# Patient Record
Sex: FEMALE | Race: WHITE | NOT HISPANIC OR LATINO | Employment: OTHER | URBAN - METROPOLITAN AREA
[De-identification: names, ages, dates, MRNs, and addresses within clinical notes are randomized per-mention and may not be internally consistent; named-entity substitution may affect disease eponyms.]

---

## 2018-05-21 ENCOUNTER — OFFICE VISIT (OUTPATIENT)
Dept: OPHTHALMOLOGY | Facility: CLINIC | Age: 83
End: 2018-05-21
Payer: MEDICARE

## 2018-05-21 DIAGNOSIS — H43.813 VITREOUS DETACHMENT OF BOTH EYES: ICD-10-CM

## 2018-05-21 DIAGNOSIS — Z96.1 PSEUDOPHAKIA: ICD-10-CM

## 2018-05-21 DIAGNOSIS — H04.123 DRY EYE SYNDROME OF BOTH EYES: ICD-10-CM

## 2018-05-21 DIAGNOSIS — H26.491 RIGHT POSTERIOR CAPSULAR OPACIFICATION: Primary | ICD-10-CM

## 2018-05-21 PROCEDURE — 66821 AFTER CATARACT LASER SURGERY: CPT | Mod: RT,S$GLB,, | Performed by: OPHTHALMOLOGY

## 2018-05-21 PROCEDURE — 92014 COMPRE OPH EXAM EST PT 1/>: CPT | Mod: 57,S$GLB,, | Performed by: OPHTHALMOLOGY

## 2018-05-21 PROCEDURE — 99999 PR PBB SHADOW E&M-EST. PATIENT-LVL II: CPT | Mod: PBBFAC,,, | Performed by: OPHTHALMOLOGY

## 2018-05-21 RX ORDER — METOPROLOL TARTRATE 50 MG/1
50 TABLET ORAL 2 TIMES DAILY
Refills: 5 | COMMUNITY
Start: 2018-03-23

## 2018-05-21 RX ORDER — WARFARIN 3 MG/1
TABLET ORAL
Refills: 2 | Status: ON HOLD | COMMUNITY
Start: 2018-03-26 | End: 2024-03-11 | Stop reason: CLARIF

## 2018-05-21 RX ORDER — FLUTICASONE FUROATE AND VILANTEROL TRIFENATATE 100; 25 UG/1; UG/1
POWDER RESPIRATORY (INHALATION)
Status: ON HOLD | COMMUNITY
Start: 2018-05-15 | End: 2024-03-16 | Stop reason: HOSPADM

## 2018-05-21 RX ORDER — ACETAMINOPHEN AND CODEINE PHOSPHATE 300; 30 MG/1; MG/1
TABLET ORAL
Refills: 1 | COMMUNITY
Start: 2018-05-10

## 2018-05-21 RX ORDER — ALPRAZOLAM 0.25 MG/1
0.25 TABLET ORAL 2 TIMES DAILY
Refills: 1 | COMMUNITY
Start: 2018-05-10

## 2018-05-21 NOTE — PROGRESS NOTES
"Subjective:       Patient ID: Garima Fraser is a 82 y.o. female.    Chief Complaint: Blurred Vision    HPI     DSL- 3/2/16     Pt states she has a "Film" over OD. Pt has flashes of light OD X 2 years.   Pt has eye allergies. Pt denies glare.    Eyemeds  At's OU PRN       Last edited by Grace Dunbar on 5/21/2018 12:52 PM. (History)             Assessment:       1. Right posterior capsular opacification    2. Vitreous detachment of both eyes    3. Dry eye syndrome of both eyes    4. Pseudophakia        Plan:       Visually significant  PCO OD- Pt. Wants Laser.     PVD's OU-Stable.  SUMMER-Doing well.        YAG CAP OD today. TE=   47.0 mj, Avg. 0.5mj, 94 pulses.  PF taper OD.  AT's.  RTC 2 wks.  "

## 2018-07-16 ENCOUNTER — OFFICE VISIT (OUTPATIENT)
Dept: OPHTHALMOLOGY | Facility: CLINIC | Age: 83
End: 2018-07-16
Payer: MEDICARE

## 2018-07-16 DIAGNOSIS — Z96.1 PSEUDOPHAKIA: ICD-10-CM

## 2018-07-16 DIAGNOSIS — H52.7 REFRACTIVE ERROR: ICD-10-CM

## 2018-07-16 DIAGNOSIS — Z98.890 POST-OPERATIVE STATE: Primary | ICD-10-CM

## 2018-07-16 PROCEDURE — 99999 PR PBB SHADOW E&M-EST. PATIENT-LVL II: CPT | Mod: PBBFAC,,, | Performed by: OPHTHALMOLOGY

## 2018-07-16 PROCEDURE — 99024 POSTOP FOLLOW-UP VISIT: CPT | Mod: S$GLB,,, | Performed by: OPHTHALMOLOGY

## 2018-07-16 NOTE — PROGRESS NOTES
Subjective:       Patient ID: Garima Fraser is a 83 y.o. female.    Chief Complaint: Post-op Evaluation (2 months po os)    HPI     Post-op Evaluation    Additional comments: 2 months po os           Comments   DSL- 5/21/18     Pt is here for 2 months Yag po os. Pt states Va OD had improved after Yag.   Pt has dry eyes.     Eyemeds  No gtts       Last edited by Grace Dunbar on 7/16/2018  1:25 PM. (History)             Assessment:       1. Post-operative state    2. Refractive error    3. Pseudophakia        Plan:       S/p YAG CAP OD-Doing well.  RE-Pt wants MRx for distance OU.        Give MRx for distance.  RTC 1 yr.

## 2019-03-28 ENCOUNTER — HOSPITAL ENCOUNTER (OUTPATIENT)
Dept: RADIOLOGY | Facility: HOSPITAL | Age: 84
Discharge: HOME | End: 2019-03-28
Attending: INTERNAL MEDICINE
Payer: MEDICARE

## 2019-03-28 ENCOUNTER — OFFICE VISIT (OUTPATIENT)
Dept: PULMONOLOGY | Facility: CLINIC | Age: 84
End: 2019-03-28
Payer: MEDICARE

## 2019-03-28 VITALS
DIASTOLIC BLOOD PRESSURE: 74 MMHG | BODY MASS INDEX: 37.79 KG/M2 | WEIGHT: 205.38 LBS | HEART RATE: 80 BPM | HEIGHT: 62 IN | SYSTOLIC BLOOD PRESSURE: 126 MMHG | OXYGEN SATURATION: 95 %

## 2019-03-28 DIAGNOSIS — R06.02 SOB (SHORTNESS OF BREATH): ICD-10-CM

## 2019-03-28 DIAGNOSIS — G47.33 OSA (OBSTRUCTIVE SLEEP APNEA): ICD-10-CM

## 2019-03-28 PROCEDURE — 99999 PR PBB SHADOW E&M-EST. PATIENT-LVL V: ICD-10-PCS | Mod: PBBFAC,,, | Performed by: INTERNAL MEDICINE

## 2019-03-28 PROCEDURE — 71046 XR CHEST PA AND LATERAL: ICD-10-PCS | Mod: 26,,, | Performed by: RADIOLOGY

## 2019-03-28 PROCEDURE — 1101F PT FALLS ASSESS-DOCD LE1/YR: CPT | Mod: CPTII,S$GLB,, | Performed by: INTERNAL MEDICINE

## 2019-03-28 PROCEDURE — 99499 RISK ADDL DX/OHS AUDIT: ICD-10-PCS | Mod: S$GLB,,, | Performed by: INTERNAL MEDICINE

## 2019-03-28 PROCEDURE — 99204 PR OFFICE/OUTPT VISIT, NEW, LEVL IV, 45-59 MIN: ICD-10-PCS | Mod: S$GLB,,, | Performed by: INTERNAL MEDICINE

## 2019-03-28 PROCEDURE — 71046 X-RAY EXAM CHEST 2 VIEWS: CPT | Mod: TC,FY

## 2019-03-28 PROCEDURE — 99999 PR PBB SHADOW E&M-EST. PATIENT-LVL V: CPT | Mod: PBBFAC,,, | Performed by: INTERNAL MEDICINE

## 2019-03-28 PROCEDURE — 99204 OFFICE O/P NEW MOD 45 MIN: CPT | Mod: S$GLB,,, | Performed by: INTERNAL MEDICINE

## 2019-03-28 PROCEDURE — 71046 X-RAY EXAM CHEST 2 VIEWS: CPT | Mod: 26,,, | Performed by: RADIOLOGY

## 2019-03-28 PROCEDURE — 1101F PR PT FALLS ASSESS DOC 0-1 FALLS W/OUT INJ PAST YR: ICD-10-PCS | Mod: CPTII,S$GLB,, | Performed by: INTERNAL MEDICINE

## 2019-03-28 PROCEDURE — 99499 UNLISTED E&M SERVICE: CPT | Mod: S$GLB,,, | Performed by: INTERNAL MEDICINE

## 2019-03-28 NOTE — PATIENT INSTRUCTIONS
Odette or Lea will contact you to schedule your sleep study. Their number is [phone]. The [facility] is located on 2nd floor of [facility].    We will call you when the sleep study results are ready - if you have not heard from us by 2 weeks from the date of the study, please call [phone].    You are advised to abstain from driving should you feel sleepy or drowsy.

## 2019-03-28 NOTE — PROGRESS NOTES
Garima Fraser  was seen as a new patient for the evaluation of sob.    CHIEF COMPLAINT:  Asthma and Shortness of Breath      HISTORY OF PRESENT ILLNESS: Garima Fraser is a 83 y.o. female  has a past medical history of After-cataract, obscuring vision (1/14/2015), Atrial fibrillation, Hearing loss, Osteoporosis, Shingles, and Spinal stenosis.  Patient with persistent zimmerman over 10 years.  Zimmerman worsen over past 6 months.  +zimmerman 1/2 block.  No chest pain.  No fever/chills.  No cough.  Occasional wheezing a/w heavy exertion.  No orthopnea.  No pnd.  +chronic lower extremities swelling.  +weight gain 30 lbs since 2017.  Dyspnea worsen when bending down to tie shoe lace.  Patient was started on breo without much improvement.  gerd control with prilosec.  Patient was prescribed breo without much improvement.      PAST MEDICAL HISTORY:    Active Ambulatory Problems     Diagnosis Date Noted    After-cataract, obscuring vision 01/14/2015    After-cataract, unspecified 01/14/2015    Vitreous detachment 01/14/2015    Refractive error 01/14/2015    Pseudophakia 01/14/2015    Dry eye syndrome 01/14/2015    Post-operative state 01/28/2015    Shingles 03/02/2016    Right posterior capsular opacification 03/02/2016    Atrial fibrillation with RVR 06/21/2016    SOB (shortness of breath) 06/24/2016    ADIS (obstructive sleep apnea) 03/28/2019     Resolved Ambulatory Problems     Diagnosis Date Noted    No Resolved Ambulatory Problems     Past Medical History:   Diagnosis Date    After-cataract, obscuring vision 1/14/2015    Atrial fibrillation     Hearing loss     Osteoporosis     Shingles     Spinal stenosis                 PAST SURGICAL HISTORY:    Past Surgical History:   Procedure Laterality Date    ABDOMINAL SURGERY      CATARACT EXTRACTION Right 06/29/10    CATARACT EXTRACTION Left 06/15/10    CHOLECYSTECTOMY      HYSTERECTOMY           FAMILY HISTORY:                Family History   Problem Relation  Age of Onset    Macular degeneration Mother     Cataracts Mother     Diabetes Sister     Glaucoma Sister     Cataracts Sister     Diabetes Paternal Aunt     Amblyopia Neg Hx     Blindness Neg Hx     Retinal detachment Neg Hx     Strabismus Neg Hx        SOCIAL HISTORY:          Tobacco:   Social History     Tobacco Use   Smoking Status Never Smoker     alcohol use:    Social History     Substance and Sexual Activity   Alcohol Use Yes    Comment: occasional               Occupation:  Former teacher and     ALLERGIES:    Review of patient's allergies indicates:  No Known Allergies    CURRENT MEDICATIONS:    Current Outpatient Medications   Medication Sig Dispense Refill    acetaminophen-codeine 300-30mg (TYLENOL #3) 300-30 mg Tab TK 1 T PO  TID  1    ALPRAZolam (XANAX) 0.25 MG tablet TK 1 T PO  BID  1    amitriptyline (ELAVIL) 50 MG tablet Take 1 tablet (50 mg total) by mouth every evening. 90 tablet 1    BREO ELLIPTA 100-25 mcg/dose diskus inhaler       citalopram (CELEXA) 20 MG tablet Take 20 mg by mouth 2 (two) times daily.       metoprolol tartrate (LOPRESSOR) 50 MG tablet TK 1 T PO BID  5    omeprazole (PRILOSEC) 20 MG capsule Take 20 mg by mouth 2 (two) times daily.      tolterodine (DETROL LA) 4 MG 24 hr capsule Take 4 mg by mouth once daily.      warfarin (COUMADIN) 3 MG tablet TK 1 T PO QD TO THIN THE BLOOD  2    albuterol 90 mcg/actuation inhaler Inhale 2 puffs into the lungs every 6 (six) hours as needed for Wheezing. 2 Inhaler 2    alendronate (FOSAMAX) 10 MG Tab Take 10 mg by mouth once daily.      apixaban 5 mg Tab Take 1 tablet (5 mg total) by mouth 2 (two) times daily. 60 tablet 0    apixaban 5 mg Tab Take 1 tablet (5 mg total) by mouth 2 (two) times daily. 180 tablet 3    aspirin 81 MG Chew Take 81 mg by mouth once daily.      furosemide (LASIX) 40 MG tablet One po daily x 5 days then 1/2 daily 30 tablet 0    hydrocodone-acetaminophen 5-325mg (NORCO) 5-325 mg per  "tablet Take 1 tablet by mouth every 6 (six) hours as needed for Pain. 25 tablet 0    lidocaine-prilocaine (EMLA) cream Apply topically 2 (two) times daily as needed. 25 g 1    mometasone-formoterol (DULERA) 100-5 mcg/actuation HFAA Inhale into the lungs 2 (two) times daily.       No current facility-administered medications for this visit.                   REVIEW OF SYSTEMS:     Pulmonary related symptoms as per HPI.  Gen:  no weight loss, no fever, no night sweat  HEENT:  no visual changes, no sore throat, no hearing loss  CV:  Per hpi  GI:  no melena, no hematochezia, no diarhea, no constipation.  :  no dysuria, no hematuria, no hesistancy, no dribbling  Neuro:  no syncope, no vertigo, no tinitus  Psych:  No homocide or suicide ideation.  Anxious and depressed  Endocrine:  No heat or cold intolerance.  Sleep:  + snoring; no witnessed apnea.  Feeling tire upon awake.  Otherwise, a balance of systems reviewed is negative.          PHYSICAL EXAM:  Vitals:    03/28/19 1355   BP: 126/74   Pulse: 80   SpO2: 95%   Weight: 93.2 kg (205 lb 6.4 oz)   Height: 5' 2" (1.575 m)   PainSc: 0-No pain     Body mass index is 37.57 kg/m².     GENERAL:  well develop; no apparent distress  HEENT:  no nasal congestion; no discharge noted; class 2 modified mallampatti.  Denture on top   NECK:  supple; no palpable masses.  CARDIO: regular rate and rhythm  PULM:  clear to auscultation bilaterally; no intercostals retractions; no accessory muscle usage   ABDOMEN:  soft nontender/nondistended.  +bowel sound  EXTREMITIES no cc; +trace edema  NEURO:  CN II-XII intact.  5/5 motor in all extremities.  sensation grossly intact   to light touch.  PSYCH:  normal affect.  Alert and oriented x 4    LABS  Pulmonary Functions Testing Results(personally reviewed):  none  ABG (personally reviewed):  none  CXR (personally reviewed):  12/1/16 no effusion or consolidation  CT CHEST(personally reviewed):  6/21/16 no effusion or consolidation; dependant " atelectasis.  No septal thickening.    Pro bnp 4960 (5-1800)    Echo 6/22/16  CONCLUSIONS     1 - Severe left atrial enlargement.     2 - Concentric hypertrophy.     3 - Normal left ventricular systolic function (EF 55-60%).     4 - Normal right ventricular systolic function .     5 - Pulmonary hypertension. The estimated PA systolic pressure is 44 mmHg.     6 - Mild mitral regurgitation.     7 - Mild tricuspid regurgitation.     8 - Increased central venous pressure.     ASSESSMENT/PLAN  Problem List Items Addressed This Visit     ADIS (obstructive sleep apnea)    Overview     The patient symptomatically has snoring, restless sleep with findings of afib, htn. This warrants further investigation for possible obstructive sleep apnea.  Patient will be contacted after sleep study is done.            Relevant Orders    Polysomnogram (CPAP will be added if patient meets diagnostic criteria.)    SOB (shortness of breath)    Overview     Multiple etiologies.  +weight gain of 30 lbs + afib + diastolic dysfunction.  Baseline pft and 6 min walk.  Repeat cbc, cmp, echo         Relevant Orders    X-Ray Chest PA And Lateral (Completed)    Stress test, pulmonary    Complete PFT with bronchodilator    CBC auto differential    Comprehensive metabolic panel    Brain natriuretic peptide    Transthoracic echo (TTE) 2D with Color Flow        Patient will Follow up in about 3 months (around 6/28/2019). with md/np.

## 2019-03-29 NOTE — PROGRESS NOTES
Patient, Garima Fraser (MRN #[id number]), presented with a recorded BMI of 37.57 kg/m^2 and a documented comorbidity(s):  - Obstructive Sleep Apnea  to which the severe obesity is a contributing factor. This is consistent with the definition of severe obesity (BMI 35.0-39.9) with comorbidity (ICD-10 E66.01, Z68.35). The patient's severe obesity was monitored, evaluated, addressed and/or treated. This addendum to the medical record is made on 03/29/2019.

## 2019-04-09 ENCOUNTER — TELEPHONE (OUTPATIENT)
Dept: SLEEP MEDICINE | Facility: HOSPITAL | Age: 84
End: 2019-04-09

## 2019-04-09 NOTE — TELEPHONE ENCOUNTER
Called patient to schedule sleep study spoke with patient daughter was informed that she will call back to schedule once she completed her US and review results.

## 2019-04-09 NOTE — TELEPHONE ENCOUNTER
----- Message from Zoe Mccormick sent at 4/8/2019  2:30 PM CDT -----  Patient want to be seen on the [facility].

## 2019-04-22 ENCOUNTER — HOSPITAL ENCOUNTER (OUTPATIENT)
Dept: RESPIRATORY THERAPY | Facility: HOSPITAL | Age: 84
Discharge: HOME | End: 2019-04-22
Attending: INTERNAL MEDICINE
Payer: MEDICARE

## 2019-04-22 VITALS — OXYGEN SATURATION: 98 % | HEART RATE: 106 BPM | RESPIRATION RATE: 18 BRPM

## 2019-04-22 DIAGNOSIS — R06.02 SOB (SHORTNESS OF BREATH): ICD-10-CM

## 2019-04-22 LAB
BRPFT: ABNORMAL
DLCO ADJ PRE: 13.15 ML/(MIN*MMHG) (ref 12.58–24.05)
DLCO SINGLE BREATH LLN: 12.58
DLCO SINGLE BREATH PRE REF: 71.8 %
DLCO SINGLE BREATH REF: 18.31
DLCOC SBVA LLN: 2.48
DLCOC SBVA PRE REF: 105.7 %
DLCOC SBVA REF: 4.01
DLCOC SINGLE BREATH LLN: 12.58
DLCOC SINGLE BREATH PRE REF: 71.8 %
DLCOC SINGLE BREATH REF: 18.31
DLCOVA LLN: 2.48
DLCOVA PRE REF: 105.7 %
DLCOVA PRE: 4.23 ML/(MIN*MMHG*L) (ref 2.48–5.53)
DLCOVA REF: 4.01
DLVAADJ PRE: 4.23 ML/(MIN*MMHG*L) (ref 2.48–5.53)
ERVN2 LLN: 0.46
ERVN2 PRE REF: 123.9 %
ERVN2 PRE: 0.57 L (ref 0.46–0.46)
ERVN2 REF: 0.46
FEF 25 75 CHG: 2 %
FEF 25 75 LLN: 0.59
FEF 25 75 POST REF: 63.7 %
FEF 25 75 PRE REF: 62.4 %
FEF 25 75 REF: 1.43
FET100 CHG: 11.4 %
FEV1 CHG: -0.5 %
FEV1 FVC CHG: 0.4 %
FEV1 FVC LLN: 62
FEV1 FVC POST REF: 93.2 %
FEV1 FVC PRE REF: 92.8 %
FEV1 FVC REF: 77
FEV1 LLN: 1.22
FEV1 POST REF: 75.1 %
FEV1 PRE REF: 75.4 %
FEV1 REF: 1.75
FEV6 CHG: -1.4 %
FEV6 LLN: 1.54
FEV6 POST REF: 82.2 %
FEV6 POST: 1.78 L (ref 1.54–2.8)
FEV6 PRE REF: 83.4 %
FEV6 PRE: 1.81 L (ref 1.54–2.8)
FEV6 REF: 2.17
FRCN2 LLN: 1.78
FRCN2 PRE REF: 70.1 %
FRCN2 REF: 2.6
FVC CHG: -0.8 %
FVC LLN: 1.6
FVC POST REF: 79.6 %
FVC PRE REF: 80.2 %
FVC REF: 2.3
IVC PRE: 1.61 L (ref 1.6–3)
IVC SINGLE BREATH LLN: 1.6
IVC SINGLE BREATH PRE REF: 69.8 %
IVC SINGLE BREATH REF: 2.3
PEF CHG: -19.4 %
PEF LLN: 2.77
PEF POST REF: 71 %
PEF PRE REF: 88.1 %
PEF REF: 4.37
POST FEF 25 75: 0.91 L/S (ref 0.59–2.28)
POST FET 100: 8.83 SEC
POST FEV1 FVC: 71.82 % (ref 62.14–92.05)
POST FEV1: 1.31 L (ref 1.22–2.29)
POST FVC: 1.83 L (ref 1.6–3)
POST PEF: 3.1 L/S (ref 2.77–5.96)
PRE DLCO: 13.15 ML/(MIN*MMHG) (ref 12.58–24.05)
PRE FEF 25 75: 0.89 L/S (ref 0.59–2.28)
PRE FET 100: 7.93 SEC
PRE FEV1 FVC: 71.56 % (ref 62.14–92.05)
PRE FEV1: 1.32 L (ref 1.22–2.29)
PRE FRC N2: 1.82 L
PRE FVC: 1.85 L (ref 1.6–3)
PRE PEF: 3.85 L/S (ref 2.77–5.96)
RVN2 LLN: 1.56
RVN2 PRE REF: 58.5 %
RVN2 PRE: 1.25 L (ref 1.56–2.71)
RVN2 REF: 2.14
RVN2TLCN2 LLN: 36.91
RVN2TLCN2 PRE REF: 89 %
RVN2TLCN2 PRE: 41.39 % (ref 36.91–56.09)
RVN2TLCN2 REF: 46.5
TLCN2 LLN: 3.59
TLCN2 PRE REF: 66.1 %
TLCN2 PRE: 3.02 L (ref 3.58–5.56)
TLCN2 REF: 4.57
VA PRE: 3.11 L (ref 4.42–4.42)
VA SINGLE BREATH LLN: 4.42
VA SINGLE BREATH PRE REF: 70.3 %
VA SINGLE BREATH REF: 4.42
VCMAXN2 LLN: 1.6
VCMAXN2 PRE REF: 76.9 %
VCMAXN2 PRE: 1.77 L (ref 1.6–3)
VCMAXN2 REF: 2.3

## 2019-04-22 PROCEDURE — 94618 PULMONARY STRESS TESTING: CPT | Performed by: INTERNAL MEDICINE

## 2019-04-22 PROCEDURE — 94060 EVALUATION OF WHEEZING: CPT | Performed by: INTERNAL MEDICINE

## 2019-04-22 PROCEDURE — 25000242 PHARM REV CODE 250 ALT 637 W/ HCPCS: Performed by: INTERNAL MEDICINE

## 2019-04-22 RX ORDER — ALBUTEROL SULFATE 2.5 MG/.5ML
2.5 SOLUTION RESPIRATORY (INHALATION) ONCE
Status: COMPLETED | OUTPATIENT
Start: 2019-04-22 | End: 2019-04-22

## 2019-04-22 RX ADMIN — ALBUTEROL SULFATE 2.5 MG: 2.5 SOLUTION RESPIRATORY (INHALATION) at 01:04

## 2019-04-26 ENCOUNTER — TELEPHONE (OUTPATIENT)
Dept: SLEEP MEDICINE | Facility: HOSPITAL | Age: 84
End: 2019-04-26

## 2019-05-01 ENCOUNTER — TELEPHONE (OUTPATIENT)
Dept: PULMONOLOGY | Facility: CLINIC | Age: 84
End: 2019-05-01

## 2019-05-01 NOTE — TELEPHONE ENCOUNTER
Called patient to advise per Dr Reddy breathing test and blood work is normal and to scheduled PFT and sleep study. No answer left detailed message for pt daughter Susu to call back to be advise the number

## 2019-05-02 ENCOUNTER — TELEPHONE (OUTPATIENT)
Dept: SLEEP MEDICINE | Facility: HOSPITAL | Age: 84
End: 2019-05-02

## 2019-05-02 DIAGNOSIS — G47.33 OSA (OBSTRUCTIVE SLEEP APNEA): Primary | ICD-10-CM

## 2019-05-02 NOTE — TELEPHONE ENCOUNTER
Called patient to inform Dr Reddy place HST order it's awaiting insurance approval. No answer LVM for pt to call back

## 2019-05-03 ENCOUNTER — TELEPHONE (OUTPATIENT)
Dept: PULMONOLOGY | Facility: CLINIC | Age: 84
End: 2019-05-03

## 2019-05-03 NOTE — TELEPHONE ENCOUNTER
----- Message from Hema Greer MA sent at 5/2/2019  4:53 PM CDT -----  Contact: Pt's Daughter Susu Fraser  Pt's Daughter Susu Fraser would like to be called back regarding  A sleep test.        Pt's Daughter Susu Fraser can be reached at [phone].      Thanks.

## 2019-05-03 NOTE — TELEPHONE ENCOUNTER
Called pt to address her concern about CPAP no answer left message for patient to call back to discuss

## 2020-10-11 ENCOUNTER — PATIENT MESSAGE (OUTPATIENT)
Dept: OPHTHALMOLOGY | Facility: CLINIC | Age: 85
End: 2020-10-11

## 2020-12-09 ENCOUNTER — OFFICE VISIT (OUTPATIENT)
Dept: OPHTHALMOLOGY | Facility: CLINIC | Age: 85
End: 2020-12-09
Payer: MEDICARE

## 2020-12-09 DIAGNOSIS — H52.7 REFRACTIVE ERROR: ICD-10-CM

## 2020-12-09 DIAGNOSIS — Z96.1 PSEUDOPHAKIA: ICD-10-CM

## 2020-12-09 DIAGNOSIS — H04.123 DRY EYE SYNDROME OF BOTH EYES: Primary | ICD-10-CM

## 2020-12-09 DIAGNOSIS — H43.813 VITREOUS DETACHMENT OF BOTH EYES: ICD-10-CM

## 2020-12-09 PROCEDURE — 99999 PR PBB SHADOW E&M-EST. PATIENT-LVL III: ICD-10-PCS | Mod: PBBFAC,,, | Performed by: OPHTHALMOLOGY

## 2020-12-09 PROCEDURE — 3288F FALL RISK ASSESSMENT DOCD: CPT | Mod: CPTII,S$GLB,, | Performed by: OPHTHALMOLOGY

## 2020-12-09 PROCEDURE — 1126F AMNT PAIN NOTED NONE PRSNT: CPT | Mod: S$GLB,,, | Performed by: OPHTHALMOLOGY

## 2020-12-09 PROCEDURE — 3288F PR FALLS RISK ASSESSMENT DOCUMENTED: ICD-10-PCS | Mod: CPTII,S$GLB,, | Performed by: OPHTHALMOLOGY

## 2020-12-09 PROCEDURE — 1126F PR PAIN SEVERITY QUANTIFIED, NO PAIN PRESENT: ICD-10-PCS | Mod: S$GLB,,, | Performed by: OPHTHALMOLOGY

## 2020-12-09 PROCEDURE — 1101F PT FALLS ASSESS-DOCD LE1/YR: CPT | Mod: CPTII,S$GLB,, | Performed by: OPHTHALMOLOGY

## 2020-12-09 PROCEDURE — 1101F PR PT FALLS ASSESS DOC 0-1 FALLS W/OUT INJ PAST YR: ICD-10-PCS | Mod: CPTII,S$GLB,, | Performed by: OPHTHALMOLOGY

## 2020-12-09 PROCEDURE — 92014 COMPRE OPH EXAM EST PT 1/>: CPT | Mod: S$GLB,,, | Performed by: OPHTHALMOLOGY

## 2020-12-09 PROCEDURE — 99999 PR PBB SHADOW E&M-EST. PATIENT-LVL III: CPT | Mod: PBBFAC,,, | Performed by: OPHTHALMOLOGY

## 2020-12-09 PROCEDURE — 92014 PR EYE EXAM, EST PATIENT,COMPREHESV: ICD-10-PCS | Mod: S$GLB,,, | Performed by: OPHTHALMOLOGY

## 2021-01-10 ENCOUNTER — IMMUNIZATION (OUTPATIENT)
Dept: FAMILY MEDICINE | Facility: CLINIC | Age: 86
End: 2021-01-10
Payer: MEDICARE

## 2021-01-10 DIAGNOSIS — Z23 NEED FOR VACCINATION: ICD-10-CM

## 2021-01-10 PROCEDURE — 91300 COVID-19, MRNA, LNP-S, PF, 30 MCG/0.3 ML DOSE VACCINE: CPT | Mod: PBBFAC | Performed by: FAMILY MEDICINE

## 2021-01-31 ENCOUNTER — IMMUNIZATION (OUTPATIENT)
Dept: FAMILY MEDICINE | Facility: CLINIC | Age: 86
End: 2021-01-31
Payer: MEDICARE

## 2021-01-31 DIAGNOSIS — Z23 NEED FOR VACCINATION: Primary | ICD-10-CM

## 2021-01-31 PROCEDURE — 0002A COVID-19, MRNA, LNP-S, PF, 30 MCG/0.3 ML DOSE VACCINE: CPT | Mod: PBBFAC | Performed by: FAMILY MEDICINE

## 2021-01-31 PROCEDURE — 91300 COVID-19, MRNA, LNP-S, PF, 30 MCG/0.3 ML DOSE VACCINE: CPT | Mod: PBBFAC | Performed by: FAMILY MEDICINE

## 2021-11-16 ENCOUNTER — IMMUNIZATION (OUTPATIENT)
Dept: FAMILY MEDICINE | Facility: CLINIC | Age: 86
End: 2021-11-16
Payer: MEDICARE

## 2021-11-16 DIAGNOSIS — Z23 NEED FOR VACCINATION: Primary | ICD-10-CM

## 2021-11-16 PROCEDURE — 0004A COVID-19, MRNA, LNP-S, PF, 30 MCG/0.3 ML DOSE VACCINE: CPT | Mod: PBBFAC | Performed by: FAMILY MEDICINE

## 2024-03-11 ENCOUNTER — HOSPITAL ENCOUNTER (EMERGENCY)
Facility: HOSPITAL | Age: 89
Discharge: ANOTHER HOSPITAL | DRG: 872 | End: 2024-03-11
Attending: EMERGENCY MEDICINE | Admitting: INTERNAL MEDICINE
Payer: MEDICARE

## 2024-03-11 ENCOUNTER — HOSPITAL ENCOUNTER (INPATIENT)
Facility: HOSPITAL | Age: 89
LOS: 5 days | Discharge: HOME HEALTH | DRG: 871 | End: 2024-03-16
Attending: STUDENT IN AN ORGANIZED HEALTH CARE EDUCATION/TRAINING PROGRAM | Admitting: STUDENT IN AN ORGANIZED HEALTH CARE EDUCATION/TRAINING PROGRAM
Payer: MEDICARE

## 2024-03-11 VITALS
SYSTOLIC BLOOD PRESSURE: 137 MMHG | TEMPERATURE: 98 F | RESPIRATION RATE: 27 BRPM | DIASTOLIC BLOOD PRESSURE: 61 MMHG | OXYGEN SATURATION: 99 % | HEART RATE: 117 BPM | BODY MASS INDEX: 31.83 KG/M2 | WEIGHT: 174 LBS

## 2024-03-11 DIAGNOSIS — J18.9 COMMUNITY ACQUIRED PNEUMONIA, UNSPECIFIED LATERALITY: Primary | ICD-10-CM

## 2024-03-11 DIAGNOSIS — R07.9 CHEST PAIN: ICD-10-CM

## 2024-03-11 DIAGNOSIS — R09.02 HYPOXIA: ICD-10-CM

## 2024-03-11 DIAGNOSIS — J45.40 MODERATE PERSISTENT ASTHMA, UNSPECIFIED WHETHER COMPLICATED: ICD-10-CM

## 2024-03-11 DIAGNOSIS — J45.909 ASTHMA: Primary | ICD-10-CM

## 2024-03-11 DIAGNOSIS — R06.02 SOB (SHORTNESS OF BREATH): ICD-10-CM

## 2024-03-11 DIAGNOSIS — A41.9 SEPSIS, DUE TO UNSPECIFIED ORGANISM, UNSPECIFIED WHETHER ACUTE ORGAN DYSFUNCTION PRESENT: ICD-10-CM

## 2024-03-11 DIAGNOSIS — N39.0 URINARY TRACT INFECTION WITHOUT HEMATURIA, SITE UNSPECIFIED: ICD-10-CM

## 2024-03-11 PROBLEM — R65.20 SEVERE SEPSIS: Status: ACTIVE | Noted: 2024-03-11

## 2024-03-11 PROBLEM — I50.32 CHRONIC HEART FAILURE WITH PRESERVED EJECTION FRACTION: Status: ACTIVE | Noted: 2024-03-11

## 2024-03-11 PROBLEM — F41.9 ANXIETY: Status: ACTIVE | Noted: 2024-03-11

## 2024-03-11 LAB
ALBUMIN SERPL BCP-MCNC: 3.9 G/DL (ref 3.5–5.2)
ALP SERPL-CCNC: 91 U/L (ref 38–126)
ALT SERPL W/O P-5'-P-CCNC: 14 U/L (ref 10–44)
ANION GAP SERPL CALC-SCNC: 8 MMOL/L (ref 8–16)
AST SERPL-CCNC: 21 U/L (ref 15–46)
BACTERIA #/AREA URNS AUTO: ABNORMAL /HPF
BASOPHILS # BLD AUTO: 0.05 K/UL (ref 0–0.2)
BASOPHILS # BLD AUTO: 0.13 K/UL (ref 0–0.2)
BASOPHILS NFR BLD: 0.2 % (ref 0–1.9)
BASOPHILS NFR BLD: 0.5 % (ref 0–1.9)
BILIRUB SERPL-MCNC: 0.9 MG/DL (ref 0.1–1)
BILIRUB UR QL STRIP: NEGATIVE
CALCIUM SERPL-MCNC: 9.7 MG/DL (ref 8.7–10.5)
CHLORIDE SERPL-SCNC: 106 MMOL/L (ref 95–110)
CLARITY UR REFRACT.AUTO: ABNORMAL
CO2 SERPL-SCNC: 28 MMOL/L (ref 23–29)
COLOR UR AUTO: YELLOW
CREAT SERPL-MCNC: 0.91 MG/DL (ref 0.5–1.4)
DIFFERENTIAL METHOD BLD: ABNORMAL
DIFFERENTIAL METHOD BLD: ABNORMAL
EOSINOPHIL # BLD AUTO: 0 K/UL (ref 0–0.5)
EOSINOPHIL # BLD AUTO: 0.1 K/UL (ref 0–0.5)
EOSINOPHIL NFR BLD: 0 % (ref 0–8)
EOSINOPHIL NFR BLD: 0.5 % (ref 0–8)
ERYTHROCYTE [DISTWIDTH] IN BLOOD BY AUTOMATED COUNT: 12.6 % (ref 11.5–14.5)
ERYTHROCYTE [DISTWIDTH] IN BLOOD BY AUTOMATED COUNT: 12.9 % (ref 11.5–14.5)
EST. GFR  (NO RACE VARIABLE): >60 ML/MIN/1.73 M^2
GLUCOSE SERPL-MCNC: 119 MG/DL (ref 70–110)
GLUCOSE UR QL STRIP: NEGATIVE
HCT VFR BLD AUTO: 42.8 % (ref 37–48.5)
HCT VFR BLD AUTO: 46.8 % (ref 37–48.5)
HGB BLD-MCNC: 13.4 G/DL (ref 12–16)
HGB BLD-MCNC: 14.7 G/DL (ref 12–16)
HGB UR QL STRIP: ABNORMAL
IMM GRANULOCYTES # BLD AUTO: 0.17 K/UL (ref 0–0.04)
IMM GRANULOCYTES # BLD AUTO: 0.17 K/UL (ref 0–0.04)
IMM GRANULOCYTES NFR BLD AUTO: 0.7 % (ref 0–0.5)
IMM GRANULOCYTES NFR BLD AUTO: 0.7 % (ref 0–0.5)
INFLUENZA A, MOLECULAR: NEGATIVE
INFLUENZA B, MOLECULAR: NEGATIVE
INR PPP: 1.1 (ref 0.8–1.2)
KETONES UR QL STRIP: NEGATIVE
LACTATE SERPL-SCNC: 2.5 MMOL/L (ref 0.5–2.2)
LACTATE SERPL-SCNC: 6.3 MMOL/L (ref 0.5–2.2)
LACTATE SERPL-SCNC: 6.6 MMOL/L (ref 0.5–2.2)
LEUKOCYTE ESTERASE UR QL STRIP: NEGATIVE
LYMPHOCYTES # BLD AUTO: 0.6 K/UL (ref 1–4.8)
LYMPHOCYTES # BLD AUTO: 1.7 K/UL (ref 1–4.8)
LYMPHOCYTES NFR BLD: 2.3 % (ref 18–48)
LYMPHOCYTES NFR BLD: 7 % (ref 18–48)
MAGNESIUM SERPL-MCNC: 2 MG/DL (ref 1.6–2.6)
MCH RBC QN AUTO: 27.5 PG (ref 27–31)
MCH RBC QN AUTO: 27.6 PG (ref 27–31)
MCHC RBC AUTO-ENTMCNC: 31.3 G/DL (ref 32–36)
MCHC RBC AUTO-ENTMCNC: 31.4 G/DL (ref 32–36)
MCV RBC AUTO: 88 FL (ref 82–98)
MCV RBC AUTO: 88 FL (ref 82–98)
MICROSCOPIC COMMENT: ABNORMAL
MONOCYTES # BLD AUTO: 0.6 K/UL (ref 0.3–1)
MONOCYTES # BLD AUTO: 1.2 K/UL (ref 0.3–1)
MONOCYTES NFR BLD: 2.5 % (ref 4–15)
MONOCYTES NFR BLD: 5.2 % (ref 4–15)
NEUTROPHILS # BLD AUTO: 20.4 K/UL (ref 1.8–7.7)
NEUTROPHILS # BLD AUTO: 24.5 K/UL (ref 1.8–7.7)
NEUTROPHILS NFR BLD: 86.1 % (ref 38–73)
NEUTROPHILS NFR BLD: 94.3 % (ref 38–73)
NITRITE UR QL STRIP: POSITIVE
NRBC BLD-RTO: 0 /100 WBC
NRBC BLD-RTO: 0 /100 WBC
NT-PROBNP SERPL-MCNC: 1030 PG/ML (ref 5–1800)
PH UR STRIP: 7 [PH] (ref 5–8)
PLATELET # BLD AUTO: 288 K/UL (ref 150–450)
PLATELET # BLD AUTO: 300 K/UL (ref 150–450)
PLATELET BLD QL SMEAR: ABNORMAL
PMV BLD AUTO: 11.4 FL (ref 9.2–12.9)
PMV BLD AUTO: 11.4 FL (ref 9.2–12.9)
POTASSIUM SERPL-SCNC: 3.9 MMOL/L (ref 3.5–5.1)
PROT SERPL-MCNC: 7.3 G/DL (ref 6–8.4)
PROT UR QL STRIP: NEGATIVE
PROTHROMBIN TIME: 11.4 SEC (ref 9–12.5)
RBC # BLD AUTO: 4.88 M/UL (ref 4–5.4)
RBC # BLD AUTO: 5.33 M/UL (ref 4–5.4)
RBC #/AREA URNS AUTO: 5 /HPF (ref 0–4)
SARS-COV-2 RDRP RESP QL NAA+PROBE: NEGATIVE
SODIUM SERPL-SCNC: 142 MMOL/L (ref 136–145)
SP GR UR STRIP: <=1.005 (ref 1–1.03)
SPECIMEN SOURCE: NORMAL
TOXIC GRANULES BLD QL SMEAR: PRESENT
TROPONIN I SERPL-MCNC: <0.012 NG/ML (ref 0.01–0.03)
URN SPEC COLLECT METH UR: ABNORMAL
UROBILINOGEN UR STRIP-ACNC: NEGATIVE EU/DL
UUN UR-MCNC: 16 MG/DL (ref 7–17)
WBC # BLD AUTO: 23.67 K/UL (ref 3.9–12.7)
WBC # BLD AUTO: 25.99 K/UL (ref 3.9–12.7)
WBC #/AREA URNS AUTO: 20 /HPF (ref 0–5)

## 2024-03-11 PROCEDURE — 83735 ASSAY OF MAGNESIUM: CPT | Mod: ER | Performed by: EMERGENCY MEDICINE

## 2024-03-11 PROCEDURE — 21400001 HC TELEMETRY ROOM

## 2024-03-11 PROCEDURE — 84484 ASSAY OF TROPONIN QUANT: CPT | Mod: ER | Performed by: EMERGENCY MEDICINE

## 2024-03-11 PROCEDURE — 83605 ASSAY OF LACTIC ACID: CPT | Mod: ER | Performed by: EMERGENCY MEDICINE

## 2024-03-11 PROCEDURE — 25000003 PHARM REV CODE 250: Mod: ER | Performed by: EMERGENCY MEDICINE

## 2024-03-11 PROCEDURE — 81000 URINALYSIS NONAUTO W/SCOPE: CPT | Mod: ER | Performed by: EMERGENCY MEDICINE

## 2024-03-11 PROCEDURE — 85025 COMPLETE CBC W/AUTO DIFF WBC: CPT | Mod: ER | Performed by: EMERGENCY MEDICINE

## 2024-03-11 PROCEDURE — 93010 ELECTROCARDIOGRAM REPORT: CPT | Mod: ,,, | Performed by: INTERNAL MEDICINE

## 2024-03-11 PROCEDURE — 94640 AIRWAY INHALATION TREATMENT: CPT | Mod: ER

## 2024-03-11 PROCEDURE — 36415 COLL VENOUS BLD VENIPUNCTURE: CPT | Performed by: STUDENT IN AN ORGANIZED HEALTH CARE EDUCATION/TRAINING PROGRAM

## 2024-03-11 PROCEDURE — 94761 N-INVAS EAR/PLS OXIMETRY MLT: CPT

## 2024-03-11 PROCEDURE — 83605 ASSAY OF LACTIC ACID: CPT | Mod: 91 | Performed by: STUDENT IN AN ORGANIZED HEALTH CARE EDUCATION/TRAINING PROGRAM

## 2024-03-11 PROCEDURE — 25500020 PHARM REV CODE 255: Mod: ER | Performed by: EMERGENCY MEDICINE

## 2024-03-11 PROCEDURE — 83880 ASSAY OF NATRIURETIC PEPTIDE: CPT | Mod: ER | Performed by: EMERGENCY MEDICINE

## 2024-03-11 PROCEDURE — 63600175 PHARM REV CODE 636 W HCPCS: Performed by: STUDENT IN AN ORGANIZED HEALTH CARE EDUCATION/TRAINING PROGRAM

## 2024-03-11 PROCEDURE — 99285 EMERGENCY DEPT VISIT HI MDM: CPT | Mod: 25,ER

## 2024-03-11 PROCEDURE — 87502 INFLUENZA DNA AMP PROBE: CPT | Mod: ER | Performed by: EMERGENCY MEDICINE

## 2024-03-11 PROCEDURE — 85025 COMPLETE CBC W/AUTO DIFF WBC: CPT | Mod: 91 | Performed by: STUDENT IN AN ORGANIZED HEALTH CARE EDUCATION/TRAINING PROGRAM

## 2024-03-11 PROCEDURE — 96365 THER/PROPH/DIAG IV INF INIT: CPT | Mod: ER

## 2024-03-11 PROCEDURE — 99900035 HC TECH TIME PER 15 MIN (STAT): Mod: ER

## 2024-03-11 PROCEDURE — 96375 TX/PRO/DX INJ NEW DRUG ADDON: CPT | Mod: ER

## 2024-03-11 PROCEDURE — 87086 URINE CULTURE/COLONY COUNT: CPT | Mod: ER | Performed by: EMERGENCY MEDICINE

## 2024-03-11 PROCEDURE — 85610 PROTHROMBIN TIME: CPT | Mod: ER | Performed by: EMERGENCY MEDICINE

## 2024-03-11 PROCEDURE — 96361 HYDRATE IV INFUSION ADD-ON: CPT | Mod: ER

## 2024-03-11 PROCEDURE — 87088 URINE BACTERIA CULTURE: CPT | Mod: ER | Performed by: EMERGENCY MEDICINE

## 2024-03-11 PROCEDURE — 87077 CULTURE AEROBIC IDENTIFY: CPT | Mod: ER | Performed by: EMERGENCY MEDICINE

## 2024-03-11 PROCEDURE — 27000221 HC OXYGEN, UP TO 24 HOURS: Mod: ER

## 2024-03-11 PROCEDURE — 25000242 PHARM REV CODE 250 ALT 637 W/ HCPCS: Mod: ER | Performed by: EMERGENCY MEDICINE

## 2024-03-11 PROCEDURE — 99900035 HC TECH TIME PER 15 MIN (STAT)

## 2024-03-11 PROCEDURE — 63600175 PHARM REV CODE 636 W HCPCS: Mod: ER | Performed by: EMERGENCY MEDICINE

## 2024-03-11 PROCEDURE — 80053 COMPREHEN METABOLIC PANEL: CPT | Mod: ER | Performed by: EMERGENCY MEDICINE

## 2024-03-11 PROCEDURE — 93005 ELECTROCARDIOGRAM TRACING: CPT | Mod: ER

## 2024-03-11 PROCEDURE — 94761 N-INVAS EAR/PLS OXIMETRY MLT: CPT | Mod: ER

## 2024-03-11 PROCEDURE — U0002 COVID-19 LAB TEST NON-CDC: HCPCS | Mod: ER | Performed by: EMERGENCY MEDICINE

## 2024-03-11 PROCEDURE — 25000003 PHARM REV CODE 250: Performed by: STUDENT IN AN ORGANIZED HEALTH CARE EDUCATION/TRAINING PROGRAM

## 2024-03-11 PROCEDURE — 87186 SC STD MICRODIL/AGAR DIL: CPT | Mod: ER | Performed by: EMERGENCY MEDICINE

## 2024-03-11 RX ORDER — CITALOPRAM 20 MG/1
20 TABLET, FILM COATED ORAL 2 TIMES DAILY
Status: DISCONTINUED | OUTPATIENT
Start: 2024-03-11 | End: 2024-03-16 | Stop reason: HOSPADM

## 2024-03-11 RX ORDER — ALPRAZOLAM 0.25 MG/1
0.25 TABLET ORAL 2 TIMES DAILY PRN
Status: DISCONTINUED | OUTPATIENT
Start: 2024-03-11 | End: 2024-03-16 | Stop reason: HOSPADM

## 2024-03-11 RX ORDER — HYDRALAZINE HYDROCHLORIDE 20 MG/ML
10 INJECTION INTRAMUSCULAR; INTRAVENOUS
Status: COMPLETED | OUTPATIENT
Start: 2024-03-11 | End: 2024-03-11

## 2024-03-11 RX ORDER — BUDESONIDE AND FORMOTEROL FUMARATE DIHYDRATE 80; 4.5 UG/1; UG/1
2 AEROSOL RESPIRATORY (INHALATION) 2 TIMES DAILY PRN
COMMUNITY
Start: 2024-02-14

## 2024-03-11 RX ORDER — LANOLIN ALCOHOL/MO/W.PET/CERES
800 CREAM (GRAM) TOPICAL
Status: DISCONTINUED | OUTPATIENT
Start: 2024-03-11 | End: 2024-03-16 | Stop reason: HOSPADM

## 2024-03-11 RX ORDER — TALC
6 POWDER (GRAM) TOPICAL NIGHTLY PRN
Status: DISCONTINUED | OUTPATIENT
Start: 2024-03-11 | End: 2024-03-14

## 2024-03-11 RX ORDER — SODIUM CHLORIDE 9 MG/ML
INJECTION, SOLUTION INTRAVENOUS CONTINUOUS
Status: ACTIVE | OUTPATIENT
Start: 2024-03-12 | End: 2024-03-13

## 2024-03-11 RX ORDER — SODIUM,POTASSIUM PHOSPHATES 280-250MG
2 POWDER IN PACKET (EA) ORAL
Status: DISCONTINUED | OUTPATIENT
Start: 2024-03-11 | End: 2024-03-16 | Stop reason: HOSPADM

## 2024-03-11 RX ORDER — PROCHLORPERAZINE EDISYLATE 5 MG/ML
5 INJECTION INTRAMUSCULAR; INTRAVENOUS EVERY 6 HOURS PRN
Status: DISCONTINUED | OUTPATIENT
Start: 2024-03-11 | End: 2024-03-16 | Stop reason: HOSPADM

## 2024-03-11 RX ORDER — SODIUM CHLORIDE 9 MG/ML
500 INJECTION, SOLUTION INTRAVENOUS
Status: COMPLETED | OUTPATIENT
Start: 2024-03-11 | End: 2024-03-11

## 2024-03-11 RX ORDER — POLYETHYLENE GLYCOL 3350 17 G/17G
17 POWDER, FOR SOLUTION ORAL DAILY
Status: DISCONTINUED | OUTPATIENT
Start: 2024-03-12 | End: 2024-03-16 | Stop reason: HOSPADM

## 2024-03-11 RX ORDER — GLUCAGON 1 MG
1 KIT INJECTION
Status: DISCONTINUED | OUTPATIENT
Start: 2024-03-11 | End: 2024-03-16 | Stop reason: HOSPADM

## 2024-03-11 RX ORDER — METOPROLOL TARTRATE 50 MG/1
50 TABLET ORAL 2 TIMES DAILY
Status: DISCONTINUED | OUTPATIENT
Start: 2024-03-11 | End: 2024-03-16 | Stop reason: HOSPADM

## 2024-03-11 RX ORDER — IPRATROPIUM BROMIDE AND ALBUTEROL SULFATE 2.5; .5 MG/3ML; MG/3ML
9 SOLUTION RESPIRATORY (INHALATION) ONCE
Status: COMPLETED | OUTPATIENT
Start: 2024-03-11 | End: 2024-03-11

## 2024-03-11 RX ORDER — ENOXAPARIN SODIUM 100 MG/ML
40 INJECTION SUBCUTANEOUS EVERY 24 HOURS
Status: DISCONTINUED | OUTPATIENT
Start: 2024-03-12 | End: 2024-03-11

## 2024-03-11 RX ORDER — ACETAMINOPHEN 325 MG/1
650 TABLET ORAL EVERY 4 HOURS PRN
Status: DISCONTINUED | OUTPATIENT
Start: 2024-03-11 | End: 2024-03-16 | Stop reason: HOSPADM

## 2024-03-11 RX ORDER — THIAMINE HYDROCHLORIDE 100 MG/ML
100 INJECTION, SOLUTION INTRAMUSCULAR; INTRAVENOUS ONCE
Status: COMPLETED | OUTPATIENT
Start: 2024-03-12 | End: 2024-03-12

## 2024-03-11 RX ORDER — ONDANSETRON HYDROCHLORIDE 2 MG/ML
4 INJECTION, SOLUTION INTRAVENOUS EVERY 8 HOURS PRN
Status: DISCONTINUED | OUTPATIENT
Start: 2024-03-11 | End: 2024-03-16 | Stop reason: HOSPADM

## 2024-03-11 RX ORDER — SIMETHICONE 80 MG
1 TABLET,CHEWABLE ORAL 4 TIMES DAILY PRN
Status: DISCONTINUED | OUTPATIENT
Start: 2024-03-11 | End: 2024-03-16 | Stop reason: HOSPADM

## 2024-03-11 RX ORDER — IBUPROFEN 200 MG
24 TABLET ORAL
Status: DISCONTINUED | OUTPATIENT
Start: 2024-03-11 | End: 2024-03-16 | Stop reason: HOSPADM

## 2024-03-11 RX ORDER — NALOXONE HCL 0.4 MG/ML
0.02 VIAL (ML) INJECTION
Status: DISCONTINUED | OUTPATIENT
Start: 2024-03-11 | End: 2024-03-16 | Stop reason: HOSPADM

## 2024-03-11 RX ORDER — THIAMINE HYDROCHLORIDE 100 MG/ML
100 INJECTION, SOLUTION INTRAMUSCULAR; INTRAVENOUS ONCE
Status: DISCONTINUED | OUTPATIENT
Start: 2024-03-12 | End: 2024-03-11

## 2024-03-11 RX ORDER — SODIUM CHLORIDE 0.9 % (FLUSH) 0.9 %
10 SYRINGE (ML) INJECTION EVERY 12 HOURS PRN
Status: DISCONTINUED | OUTPATIENT
Start: 2024-03-11 | End: 2024-03-16 | Stop reason: HOSPADM

## 2024-03-11 RX ORDER — IPRATROPIUM BROMIDE AND ALBUTEROL SULFATE 2.5; .5 MG/3ML; MG/3ML
3 SOLUTION RESPIRATORY (INHALATION) EVERY 4 HOURS PRN
Status: DISCONTINUED | OUTPATIENT
Start: 2024-03-11 | End: 2024-03-12

## 2024-03-11 RX ORDER — IPRATROPIUM BROMIDE AND ALBUTEROL SULFATE 2.5; .5 MG/3ML; MG/3ML
3 SOLUTION RESPIRATORY (INHALATION) EVERY 6 HOURS
Status: DISCONTINUED | OUTPATIENT
Start: 2024-03-12 | End: 2024-03-16 | Stop reason: HOSPADM

## 2024-03-11 RX ORDER — OXYBUTYNIN CHLORIDE 5 MG/1
10 TABLET, EXTENDED RELEASE ORAL DAILY
Status: DISCONTINUED | OUTPATIENT
Start: 2024-03-12 | End: 2024-03-16 | Stop reason: HOSPADM

## 2024-03-11 RX ORDER — ALPRAZOLAM 0.25 MG/1
0.25 TABLET ORAL 2 TIMES DAILY
Status: DISCONTINUED | OUTPATIENT
Start: 2024-03-11 | End: 2024-03-11

## 2024-03-11 RX ORDER — BISACODYL 10 MG/1
10 SUPPOSITORY RECTAL DAILY PRN
Status: DISCONTINUED | OUTPATIENT
Start: 2024-03-11 | End: 2024-03-16 | Stop reason: HOSPADM

## 2024-03-11 RX ORDER — ALUMINUM HYDROXIDE, MAGNESIUM HYDROXIDE, AND SIMETHICONE 1200; 120; 1200 MG/30ML; MG/30ML; MG/30ML
30 SUSPENSION ORAL 4 TIMES DAILY PRN
Status: DISCONTINUED | OUTPATIENT
Start: 2024-03-11 | End: 2024-03-16 | Stop reason: HOSPADM

## 2024-03-11 RX ORDER — ACETAMINOPHEN 325 MG/1
650 TABLET ORAL EVERY 8 HOURS PRN
Status: DISCONTINUED | OUTPATIENT
Start: 2024-03-11 | End: 2024-03-16 | Stop reason: HOSPADM

## 2024-03-11 RX ORDER — IBUPROFEN 200 MG
16 TABLET ORAL
Status: DISCONTINUED | OUTPATIENT
Start: 2024-03-11 | End: 2024-03-16 | Stop reason: HOSPADM

## 2024-03-11 RX ORDER — OXYBUTYNIN CHLORIDE 10 MG/1
10 TABLET, EXTENDED RELEASE ORAL DAILY
COMMUNITY
Start: 2023-12-13

## 2024-03-11 RX ADMIN — SODIUM CHLORIDE 500 ML: 9 INJECTION, SOLUTION INTRAVENOUS at 06:03

## 2024-03-11 RX ADMIN — IPRATROPIUM BROMIDE AND ALBUTEROL SULFATE 9 ML: .5; 3 SOLUTION RESPIRATORY (INHALATION) at 08:03

## 2024-03-11 RX ADMIN — APIXABAN 2.5 MG: 2.5 TABLET, FILM COATED ORAL at 09:03

## 2024-03-11 RX ADMIN — Medication 6 MG: at 09:03

## 2024-03-11 RX ADMIN — CEFTRIAXONE SODIUM 1 G: 1 INJECTION, POWDER, FOR SOLUTION INTRAMUSCULAR; INTRAVENOUS at 03:03

## 2024-03-11 RX ADMIN — HYDRALAZINE HYDROCHLORIDE 10 MG: 20 INJECTION, SOLUTION INTRAMUSCULAR; INTRAVENOUS at 09:03

## 2024-03-11 RX ADMIN — CITALOPRAM HYDROBROMIDE 20 MG: 20 TABLET ORAL at 09:03

## 2024-03-11 RX ADMIN — SODIUM CHLORIDE 500 ML: 9 INJECTION, SOLUTION INTRAVENOUS at 01:03

## 2024-03-11 RX ADMIN — AZITHROMYCIN MONOHYDRATE 500 MG: 500 INJECTION, POWDER, LYOPHILIZED, FOR SOLUTION INTRAVENOUS at 09:03

## 2024-03-11 RX ADMIN — CEFTRIAXONE 1 G: 1 INJECTION, POWDER, FOR SOLUTION INTRAMUSCULAR; INTRAVENOUS at 11:03

## 2024-03-11 RX ADMIN — IPRATROPIUM BROMIDE AND ALBUTEROL SULFATE 9 ML: .5; 3 SOLUTION RESPIRATORY (INHALATION) at 06:03

## 2024-03-11 RX ADMIN — SODIUM CHLORIDE 1000 ML: 9 INJECTION, SOLUTION INTRAVENOUS at 11:03

## 2024-03-11 RX ADMIN — ALPRAZOLAM 0.25 MG: 0.25 TABLET ORAL at 10:03

## 2024-03-11 RX ADMIN — METOPROLOL TARTRATE 50 MG: 50 TABLET, FILM COATED ORAL at 09:03

## 2024-03-11 RX ADMIN — IOHEXOL 100 ML: 350 INJECTION, SOLUTION INTRAVENOUS at 09:03

## 2024-03-11 NOTE — ED NOTES
Pt's granddaughter at bedside requested for pt to be transferred to [facility] due to closeness of facility for where she lives. Dr Isaac aware and states he will get with transfer center to facilitate. Granddaughter verb understanding. Pt aware and verb understanding.

## 2024-03-11 NOTE — PHARMACY MED REC
"  [facility]           Pharmacy  Admission Medication History     The home medication history was taken by Maddison Dominguez.      Medication history obtained from Medications listed below were obtained from: [employer] software- [employer]    Based on information gathered for medication list, you may go to "Admission" then "Reconcile Home Medications" tabs to review and/or act upon those items.     The home medication list has been updated by the Pharmacy department.   Please read ALL comments highlighted in yellow.   Please address this information as you see fit.    Feel free to contact us if you have any questions or require assistance.        No current facility-administered medications on file prior to encounter.     Current Outpatient Medications on File Prior to Encounter   Medication Sig Dispense Refill    ALPRAZolam (XANAX) 0.25 MG tablet Take 0.25 mg by mouth 2 (two) times a day.  1    apixaban (ELIQUIS) 2.5 mg Tab Take 2.5 mg by mouth once daily.      metoprolol tartrate (LOPRESSOR) 50 MG tablet Take 50 mg by mouth 2 (two) times daily.  5    oxybutynin (DITROPAN-XL) 10 MG 24 hr tablet Take 10 mg by mouth once daily.      SYMBICORT 80-4.5 mcg/actuation HFAA Inhale 2 puffs into the lungs 2 (two) times daily as needed.      acetaminophen-codeine 300-30mg (TYLENOL #3) 300-30 mg Tab TK 1 T PO  TID  1    albuterol 90 mcg/actuation inhaler Inhale 2 puffs into the lungs every 6 (six) hours as needed for Wheezing. 2 Inhaler 2    alendronate (FOSAMAX) 10 MG Tab Take 10 mg by mouth once daily.      amitriptyline (ELAVIL) 50 MG tablet Take 1 tablet (50 mg total) by mouth every evening. (Patient not taking: Reported on 3/11/2024) 90 tablet 1    apixaban 5 mg Tab Take 1 tablet (5 mg total) by mouth 2 (two) times daily. 60 tablet 0    apixaban 5 mg Tab Take 1 tablet (5 mg total) by mouth 2 (two) times daily. 180 tablet 3    aspirin 81 MG Chew Take 81 mg by mouth once daily.      BREO ELLIPTA 100-25 " mcg/dose diskus inhaler       citalopram (CELEXA) 20 MG tablet Take 20 mg by mouth 2 (two) times daily.       furosemide (LASIX) 40 MG tablet One po daily x 5 days then 1/2 daily 30 tablet 0    hydrocodone-acetaminophen 5-325mg (NORCO) 5-325 mg per tablet Take 1 tablet by mouth every 6 (six) hours as needed for Pain. 25 tablet 0    lidocaine-prilocaine (EMLA) cream Apply topically 2 (two) times daily as needed. 25 g 1    mometasone-formoterol (DULERA) 100-5 mcg/actuation HFAA Inhale into the lungs 2 (two) times daily.      omeprazole (PRILOSEC) 20 MG capsule Take 20 mg by mouth 2 (two) times daily.      tolterodine (DETROL LA) 4 MG 24 hr capsule Take 4 mg by mouth once daily.      warfarin (COUMADIN) 3 MG tablet TK 1 T PO QD TO THIN THE BLOOD  2       Please address this information as you see fit.  Feel free to contact us if you have any questions or require assistance.    Maddison Dominguez  [phone]                .

## 2024-03-11 NOTE — Clinical Note
Diagnosis: Asthma [317622]   Future Attending Provider: KRYSTYNA MEYER [[id number]]   Reason for IP Medical Treatment  (Clinical interventions that can only be accomplished in the IP setting? ) :: asthma w/ hypoxia   I certify that Inpatient services for greater than or equal to 2 midnights are medically necessary:: Yes   Plans for Post-Acute care--if anticipated (pick the single best option):: A. No post acute care anticipated at this time

## 2024-03-11 NOTE — PLAN OF CARE
Outside Transfer Acceptance Note / [facility]    Referring facility: [facility] ED   Referring provider: SHILO DESIR  Accepting facility: [facility]  Accepting provider: Dhara CARTER  Admitting provider: PRIYA  Reason for transfer:  Acute hypoxic RF  Transfer diagnosis: Same  Transfer specialty requested: Hospital Medicine  Transfer specialty notified: Yes  Transfer level: NUMBER 1-5: 2  Bed type requested: Med-tele  Isolation status: No active isolations   Admission class or status: IP- Inpatient      Narrative     88F mixed obstructive lung disease (asthma/COPD), HTN, chronic diastolic HF, chronic atrial fibrillation on A/C currently at [facility] for evaluation of sepsis 2/2 UTI (v URTI/bronchitis) and acute hypoxemic RF. Patient presents with shortness of breath that started this morning. Vital signs notable for hypertension and tachypnea. She is also hypoxic with room air sats in the high 80s on room air. Improved to mid 90s on nasal cannula. She is awake and alert. No significant edema in the legs. Lungs without significant expiratory wheezing. CBC with leucocytosis to 24K, CMP, NT-proBNP, TnI are unremarkable. LA is 2.5, while RVP is negative. UA positive. CTPE shows no PE or acute infiltrate, though does raise concern for mild mosaic perfusion pattern throughout the lungs which could be secondary to chronic pulmonary vascular or airways process. Sepsis fluids started and patient treated with CTX. Patient has also received duoneb tx and solumedrol. Transfer is targeted to [facility].    Objective     Vitals: Temp: 97.9 °F (36.6 °C) (03/11/24 0812)  Pulse: 108 (03/11/24 1501)  Resp: (!) 30 (03/11/24 1501)  BP: (!) 145/65 (03/11/24 1501)  SpO2: (!) 91 % (03/11/24 1501)  Recent Labs: All pertinent labs within the past 24 hours have been reviewed.  Recent imaging: See above   Airway:     Vent settings:         IV access:        Peripheral IV - Single Lumen 03/11/24 0813 18 G  Left Antecubital (Active)   Site Assessment Clean;Dry;Intact 03/11/24 0813   Extremity Assessment Distal to IV No warmth;No swelling;No redness;No abnormal discoloration 03/11/24 0813   Line Status Blood return noted;Saline locked;Flushed 03/11/24 0813   Dressing Status Clean;Dry;Intact 03/11/24 0813     Infusions: See above  Allergies:   Review of patient's allergies indicates:   Allergen Reactions    Adhesive Rash      NPO: No    Anticoagulation:   Anticoagulants       None             Instructions      [facility]  Admit to Hospital Medicine  Upon patient arrival to floor, please contact Hospital Medicine on call.

## 2024-03-11 NOTE — ED NOTES
Pt's minal at bedside and aware pt is being transferred to [facility]. Pt verb understanding also. Pt left via [facility] stretcher. AAOx3, resp even nonlabored. O2 @lNC, denies complaints states she still just wishes she could  to go home. Belongings of blanket and clothes sent with pt. Minal has the rest of her belongings and will meet pt at [facility].

## 2024-03-11 NOTE — ED NOTES
PHN authorization for transport form faxed to [facility], awaiting authorization number.

## 2024-03-11 NOTE — ED PROVIDER NOTES
Encounter Date: 3/11/2024       History     Chief Complaint   Patient presents with    Shortness of Breath     Cough x 2 weeks- pt took a zpack. SOB that started at 0630. EMS reports O2 sats 90% on RA, respirations 44, SOB increased with ambulation and talking     88-year-old female with a history of AFib on Coumadin, hypertension, asthma, CHF presenting with shortness of breath that started this morning.  Patient says she takes blood pressure medicine but is not sure which 1.  She has been taking her Symbicort without relief.  Patient received DuoNeb treatment and Solu-Medrol with EMS.  Initial O2 sats around 90% on room air.  Patient does not wear home oxygen.      Review of patient's allergies indicates:   Allergen Reactions    Adhesive Rash     Past Medical History:   Diagnosis Date    After-cataract, obscuring vision 1/14/2015    Atrial fibrillation     Hearing loss     Osteoporosis     Shingles     Spinal stenosis      Past Surgical History:   Procedure Laterality Date    ABDOMINAL SURGERY      CATARACT EXTRACTION Right 06/29/10    CATARACT EXTRACTION Left 06/15/10    CHOLECYSTECTOMY      HYSTERECTOMY       Family History   Problem Relation Age of Onset    Macular degeneration Mother     Cataracts Mother     Diabetes Sister     Glaucoma Sister     Cataracts Sister     Diabetes Paternal Aunt     Amblyopia Neg Hx     Blindness Neg Hx     Retinal detachment Neg Hx     Strabismus Neg Hx      Social History     Tobacco Use    Smoking status: Never   Substance Use Topics    Alcohol use: Yes     Comment: occasional    Drug use: Never     Review of Systems   Constitutional:  Negative for fever.   Eyes:  Negative for pain.   Respiratory:  Positive for shortness of breath.    Cardiovascular:  Negative for chest pain.   Gastrointestinal:  Negative for abdominal pain, nausea and vomiting.   Genitourinary:  Negative for difficulty urinating.   Musculoskeletal:  Negative for back pain and neck pain.   Neurological:   Negative for headaches.   Psychiatric/Behavioral:  Negative for confusion.        Physical Exam     Initial Vitals [03/11/24 0812]   BP Pulse Resp Temp SpO2   (!) 213/99 68 (!) 40 97.9 °F (36.6 °C) (!) 94 %      MAP       --         Physical Exam    Nursing note and vitals reviewed.  HENT:   Head: Atraumatic.   Eyes: Conjunctivae and EOM are normal.   Neck:   Normal range of motion.  Cardiovascular:      Exam reveals no gallop and no friction rub.       No murmur heard.  Pulmonary/Chest: Breath sounds normal. She has no wheezes. She has no rhonchi. She has no rales.   Tachypnea   Abdominal: Abdomen is soft. Bowel sounds are normal. She exhibits no distension. There is no abdominal tenderness. There is no rebound.   Musculoskeletal:         General: No edema. Normal range of motion.      Cervical back: Normal range of motion.     Neurological: She is alert and oriented to person, place, and time.   Psychiatric: She has a normal mood and affect.         ED Course   Critical Care    Date/Time: 3/11/2024 10:34 AM    Performed by: Naun Isaac MD  Authorized by: Naun Isaac MD  Direct patient critical care time: 28 minutes  Ordering / reviewing critical care time: 10 minutes  Documentation critical care time: 10 minutes  Consulting other physicians critical care time: 5 minutes  Total critical care time (exclusive of procedural time) : 53 minutes  Critical care was necessary to treat or prevent imminent or life-threatening deterioration of the following conditions: respiratory failure.  Critical care was time spent personally by me on the following activities: discussions with consultants, development of treatment plan with patient or surrogate, evaluation of patient's response to treatment, examination of patient, obtaining history from patient or surrogate, ordering and performing treatments and interventions, ordering and review of laboratory studies, ordering and review of radiographic studies, pulse  oximetry, re-evaluation of patient's condition and review of old charts.        Labs Reviewed   CBC W/ AUTO DIFFERENTIAL - Abnormal; Notable for the following components:       Result Value    WBC 23.67 (*)     MCHC 31.4 (*)     Immature Granulocytes 0.7 (*)     Gran # (ANC) 20.4 (*)     Immature Grans (Abs) 0.17 (*)     Mono # 1.2 (*)     Gran % 86.1 (*)     Lymph % 7.0 (*)     All other components within normal limits   COMPREHENSIVE METABOLIC PANEL - Abnormal; Notable for the following components:    Glucose 119 (*)     All other components within normal limits   LACTIC ACID, PLASMA - Abnormal; Notable for the following components:    Lactate (Lactic Acid) 2.5 (*)     All other components within normal limits   URINALYSIS, REFLEX TO URINE CULTURE - Abnormal; Notable for the following components:    Appearance, UA Hazy (*)     Specific Gravity, UA <=1.005 (*)     Occult Blood UA Trace (*)     Nitrite, UA Positive (*)     All other components within normal limits    Narrative:     Preferred Collection Type->Urine, Clean Catch  Specimen Source->Urine   URINALYSIS MICROSCOPIC - Abnormal; Notable for the following components:    RBC, UA 5 (*)     WBC, UA 20 (*)     Bacteria Few (*)     All other components within normal limits    Narrative:     Preferred Collection Type->Urine, Clean Catch  Specimen Source->Urine   INFLUENZA A & B BY MOLECULAR   CULTURE, URINE   TROPONIN I   NT-PRO NATRIURETIC PEPTIDE   MAGNESIUM   SARS-COV-2 RNA AMPLIFICATION, QUAL    Narrative:     Is the patient symptomatic?->Yes   PROTIME-INR   LACTIC ACID, PLASMA     EKG Readings: (Independently Interpreted)   Initial Reading: No STEMI. Rhythm: Atrial Fibrillation. Heart Rate: 85. Ectopy: No Ectopy. Conduction: Normal.     ECG Results              EKG 12-lead (In process)        Collection Time Result Time QRS Duration OHS QTC Calculation    03/11/24 08:31:24 03/11/24 09:46:46 92 423                     In process by Interface, Lab In [facility]  (03/11/24 09:47:14)                   Narrative:    Test Reason : R06.02,    Vent. Rate : 085 BPM     Atrial Rate : 068 BPM     P-R Int : 000 ms          QRS Dur : 092 ms      QT Int : 356 ms       P-R-T Axes : 000 109 021 degrees     QTc Int : 423 ms    Atrial fibrillation  Rightward axis  Incomplete right bundle branch block  Abnormal ECG  When compared with ECG of 01-DEC-2016 14:03,  T wave amplitude has increased in Anterior leads    Referred By: [email or web address]   SELF           Confirmed By:                       In process by Interface, Lab In [facility] (03/11/24 09:36:27)                   Narrative:    Test Reason : R06.02,    Vent. Rate : 085 BPM     Atrial Rate : 068 BPM     P-R Int : 000 ms          QRS Dur : 092 ms      QT Int : 356 ms       P-R-T Axes : 000 109 021 degrees     QTc Int : 423 ms    Atrial fibrillation  Rightward axis  Incomplete right bundle branch block  Abnormal ECG  When compared with ECG of 01-DEC-2016 14:03,  T wave amplitude has increased in Anterior leads    Referred By: [email or web address]   SELF           Confirmed By:                                   Imaging Results              CTA Chest Non-Coronary (PE Studies) (Final result)  Result time 03/11/24 10:04:06      Final result by Hany Dominguez MD (03/11/24 10:04:06)                   Impression:      No acute findings.  Negative for pulmonary embolus.    Additional findings as noted above.    All CT scans at this facility are performed  using dose modulation techniques as appropriate to performed exam including the following:  automated exposure control; adjustment of mA and/or kV according to the patients size (this includes techniques or standardized protocols for targeted exams where dose is matched to indication/reason for exam: i.e. extremities or head);  iterative reconstruction technique.      Electronically signed by: Hany Dominguez MD  Date:    03/11/2024  Time:    10:04               Narrative:    EXAMINATION:  CTA CHEST NON CORONARY  (PE STUDIES)    CLINICAL HISTORY:  Pulmonary embolism (PE) suspected, high prob;    TECHNIQUE:  CT angiogram through the chest following IV contrast administration.  Multiplanar MIP reformations performed.    COMPARISON:  Chest x-ray 03/11/2024    FINDINGS  Heart: Cardiomegaly.  Coronary arterial calcifications.  No pericardial effusions.  Marked left atrial enlargement.    Mediastinum: No adenopathy.  Unremarkable.    Vasculature: No pulmonary emboli.  Moderate aortic atherosclerosis without aneurysm or dissection.    Lungs: The lungs are clear of acute infiltrate or effusion.  Minimal linear atelectasis at the anterior left lung base within the lingular segment left upper lobe.  Mild mosaic perfusion pattern throughout the lungs which could be secondary to chronic pulmonary vascular or airways process    Esophagus: Unremarkable.    Upper Abdomen: Partially visualized atrophic right kidney    Bones: Significant chronic L1 compression deformity with a moderate canal stenosis secondary to mild retropulsion of the posterosuperior vertebra by 6 mm.  No significant arthritic changes.                                       X-Ray Chest AP Portable (Final result)  Result time 03/11/24 08:34:17      Final result by Shmuel Charles MD (03/11/24 08:34:17)                   Impression:      No acute process seen.      Electronically signed by: Shmuel Charles MD  Date:    03/11/2024  Time:    08:34               Narrative:    EXAMINATION:  XR CHEST AP PORTABLE    CLINICAL HISTORY:  sob;    FINDINGS:  Single view of the chest.  Comparison 03/28/2019.    Cardiac silhouette is normal.  Background of chronic interstitial lung disease suspected.  Mild atelectasis left lung base.  The lungs demonstrate no evidence of active disease.  No evidence of pleural effusion or pneumothorax.  Bones appear intact.  Remote left-sided rib fracture suspected.  Moderate degenerative changes and moderate atherosclerotic disease.                                     X-Rays:   Independently Interpreted Readings:   Chest X-Ray: Normal heart size.  No infiltrates.  No acute abnormalities.     Medications   0.9%  NaCl infusion (has no administration in time range)   hydrALAZINE injection 10 mg (10 mg Intravenous Given 3/11/24 0927)   albuterol-ipratropium 2.5 mg-0.5 mg/3 mL nebulizer solution 9 mL (9 mLs Nebulization Given 3/11/24 0854)   iohexoL (OMNIPAQUE 350) injection 100 mL (100 mLs Intravenous Given 3/11/24 0954)   0.9%  NaCl infusion (500 mLs Intravenous New Bag 3/11/24 1317)   cefTRIAXone (Rocephin) 1 g in dextrose 5 % in water (D5W) 100 mL IVPB (MB+) (1 g Intravenous New Bag 3/11/24 1557)     Medical Decision Making  88-year-old female with a history of asthma, hypertension, CHF, AFib presenting with shortness of breath that started this morning.  Vital signs notable for hypertension and tachypnea.  She is also hypoxic with room air sats in the high 80s on room air.  Improved to mid 90s on nasal cannula.  She is awake and alert.  No significant edema in the legs.  Lungs without significant expiratory wheezing.  Plan for cardiac workup.    Amount and/or Complexity of Data Reviewed  Labs: ordered. Decision-making details documented in ED Course.  Radiology: ordered.    Risk  Prescription drug management.      Additional MDM:   Differential Diagnosis:   Differential diagnosis includes but not limited to CHF exacerbation, hypertensive emergency, asthma exacerbation, anemia, pleural effusion amongst others             ED Course as of 03/11/24 1637   Mon Mar 11, 2024   0913 Comprehensive Metabolic Panel(!) [SN]   0913 CBC Auto Differential(!) [SN]   0914 INR: 1.1 [SN]   0914 Magnesium : 2.0 [SN]   0922 NT-proBNP: 1030 [SN]   0925 Troponin I: <0.012 [SN]   0925 With INR being subtherapeutic, will obtain CTA to evaluate for PE. [SN]   0945 Influenza A & B by Molecular [SN]   0945 SARS-CoV-2 RNA, Amplification, Qual: Negative [SN]   1008 CTA shows no evidence of  pulmonary embolism.  No infiltrate or effusion.  No edema. [SN]   1034 On re-evaluation, after breathing treatments, on 2-4 L nasal cannula, patient has improved tachypnea however when attempted to wean off nasal cannula, patient becomes tachypneic and and D sats to the lower 90s.  Discussed with internal medicine for admission for further treatment of asthma exacerbation.  Of note, patient's leukocytosis is isolated without fever, tachycardia or infiltrate.  No evidence of sepsis at this time.  Medicine aware. [SN]   1545 Lactic acid noted to be mildly elevated.  With leukocytosis, urinalysis also ordered and shows evidence of UTI.  IV fluids continued and ceftriaxone ordered. [SN]   1546 Patient's granddaughter requesting patient be admitted to [facility] as this is closer to her home.  Will send request a PFC. [SN]   1634 Discussed with [facility] medicine  regarding admission at [facility].  Patient has been accepted for transfer to [facility]. [SN]      ED Course User Index  [SN] Naun Isaac MD                             Clinical Impression:  Final diagnoses:  [R06.02] SOB (shortness of breath)  [J45.909] Asthma (Primary)  [R09.02] Hypoxia  [N39.0] Urinary tract infection without hematuria, site unspecified  [A41.9] Sepsis, due to unspecified organism, unspecified whether acute organ dysfunction present          ED Disposition Condition    Transfer to Another Facility Stable                Naun Isaac MD  03/11/24 1037       Naun Isaac MD  03/11/24 5892

## 2024-03-11 NOTE — ED NOTES
Pt sitting up in bed requests food to eat  pravin she forgot her teeth at home. Gave pt pudding and offered soup . Pt drinking black coffee at her request. AAOx3, denies SOB. Remains on O2 2 L NC. Remain awaiting bed assignment.

## 2024-03-12 LAB
ALBUMIN SERPL BCP-MCNC: 3.1 G/DL (ref 3.5–5.2)
ALP SERPL-CCNC: 68 U/L (ref 55–135)
ALT SERPL W/O P-5'-P-CCNC: 10 U/L (ref 10–44)
ANION GAP SERPL CALC-SCNC: 10 MMOL/L (ref 8–16)
AST SERPL-CCNC: 15 U/L (ref 10–40)
BASOPHILS # BLD AUTO: 0.04 K/UL (ref 0–0.2)
BASOPHILS NFR BLD: 0.1 % (ref 0–1.9)
BILIRUB SERPL-MCNC: 0.8 MG/DL (ref 0.1–1)
BUN SERPL-MCNC: 14 MG/DL (ref 8–23)
CALCIUM SERPL-MCNC: 9.5 MG/DL (ref 8.7–10.5)
CHLORIDE SERPL-SCNC: 111 MMOL/L (ref 95–110)
CO2 SERPL-SCNC: 19 MMOL/L (ref 23–29)
CREAT SERPL-MCNC: 0.8 MG/DL (ref 0.5–1.4)
DIFFERENTIAL METHOD BLD: ABNORMAL
EOSINOPHIL # BLD AUTO: 0 K/UL (ref 0–0.5)
EOSINOPHIL NFR BLD: 0 % (ref 0–8)
ERYTHROCYTE [DISTWIDTH] IN BLOOD BY AUTOMATED COUNT: 13.1 % (ref 11.5–14.5)
EST. GFR  (NO RACE VARIABLE): >60 ML/MIN/1.73 M^2
GLUCOSE SERPL-MCNC: 96 MG/DL (ref 70–110)
HCT VFR BLD AUTO: 40.3 % (ref 37–48.5)
HGB BLD-MCNC: 12.5 G/DL (ref 12–16)
IMM GRANULOCYTES # BLD AUTO: 0.21 K/UL (ref 0–0.04)
IMM GRANULOCYTES NFR BLD AUTO: 0.7 % (ref 0–0.5)
LACTATE SERPL-SCNC: 0.8 MMOL/L (ref 0.5–2.2)
LYMPHOCYTES # BLD AUTO: 1.5 K/UL (ref 1–4.8)
LYMPHOCYTES NFR BLD: 5.2 % (ref 18–48)
MAGNESIUM SERPL-MCNC: 1.8 MG/DL (ref 1.6–2.6)
MCH RBC QN AUTO: 26.8 PG (ref 27–31)
MCHC RBC AUTO-ENTMCNC: 31 G/DL (ref 32–36)
MCV RBC AUTO: 87 FL (ref 82–98)
MONOCYTES # BLD AUTO: 1.5 K/UL (ref 0.3–1)
MONOCYTES NFR BLD: 5 % (ref 4–15)
NEUTROPHILS # BLD AUTO: 25.8 K/UL (ref 1.8–7.7)
NEUTROPHILS NFR BLD: 89 % (ref 38–73)
NRBC BLD-RTO: 0 /100 WBC
OHS QRS DURATION: 92 MS
OHS QTC CALCULATION: 423 MS
PHOSPHATE SERPL-MCNC: 3.1 MG/DL (ref 2.7–4.5)
PLATELET # BLD AUTO: 289 K/UL (ref 150–450)
PMV BLD AUTO: 12 FL (ref 9.2–12.9)
POTASSIUM SERPL-SCNC: 3.9 MMOL/L (ref 3.5–5.1)
PROT SERPL-MCNC: 6.3 G/DL (ref 6–8.4)
RBC # BLD AUTO: 4.66 M/UL (ref 4–5.4)
SODIUM SERPL-SCNC: 140 MMOL/L (ref 136–145)
WBC # BLD AUTO: 28.99 K/UL (ref 3.9–12.7)

## 2024-03-12 PROCEDURE — 80053 COMPREHEN METABOLIC PANEL: CPT | Performed by: STUDENT IN AN ORGANIZED HEALTH CARE EDUCATION/TRAINING PROGRAM

## 2024-03-12 PROCEDURE — 63600175 PHARM REV CODE 636 W HCPCS: Performed by: STUDENT IN AN ORGANIZED HEALTH CARE EDUCATION/TRAINING PROGRAM

## 2024-03-12 PROCEDURE — 25000242 PHARM REV CODE 250 ALT 637 W/ HCPCS: Performed by: STUDENT IN AN ORGANIZED HEALTH CARE EDUCATION/TRAINING PROGRAM

## 2024-03-12 PROCEDURE — 27000221 HC OXYGEN, UP TO 24 HOURS

## 2024-03-12 PROCEDURE — 83605 ASSAY OF LACTIC ACID: CPT | Performed by: STUDENT IN AN ORGANIZED HEALTH CARE EDUCATION/TRAINING PROGRAM

## 2024-03-12 PROCEDURE — 51798 US URINE CAPACITY MEASURE: CPT

## 2024-03-12 PROCEDURE — 21400001 HC TELEMETRY ROOM

## 2024-03-12 PROCEDURE — 94799 UNLISTED PULMONARY SVC/PX: CPT | Mod: XB

## 2024-03-12 PROCEDURE — 99900035 HC TECH TIME PER 15 MIN (STAT)

## 2024-03-12 PROCEDURE — 25000003 PHARM REV CODE 250: Performed by: STUDENT IN AN ORGANIZED HEALTH CARE EDUCATION/TRAINING PROGRAM

## 2024-03-12 PROCEDURE — 94640 AIRWAY INHALATION TREATMENT: CPT

## 2024-03-12 PROCEDURE — 85025 COMPLETE CBC W/AUTO DIFF WBC: CPT | Performed by: STUDENT IN AN ORGANIZED HEALTH CARE EDUCATION/TRAINING PROGRAM

## 2024-03-12 PROCEDURE — 99900031 HC PATIENT EDUCATION (STAT)

## 2024-03-12 PROCEDURE — 84100 ASSAY OF PHOSPHORUS: CPT | Performed by: STUDENT IN AN ORGANIZED HEALTH CARE EDUCATION/TRAINING PROGRAM

## 2024-03-12 PROCEDURE — 83735 ASSAY OF MAGNESIUM: CPT | Performed by: STUDENT IN AN ORGANIZED HEALTH CARE EDUCATION/TRAINING PROGRAM

## 2024-03-12 PROCEDURE — 94761 N-INVAS EAR/PLS OXIMETRY MLT: CPT

## 2024-03-12 PROCEDURE — 87449 NOS EACH ORGANISM AG IA: CPT | Performed by: STUDENT IN AN ORGANIZED HEALTH CARE EDUCATION/TRAINING PROGRAM

## 2024-03-12 RX ADMIN — IPRATROPIUM BROMIDE AND ALBUTEROL SULFATE 3 ML: 2.5; .5 SOLUTION RESPIRATORY (INHALATION) at 12:03

## 2024-03-12 RX ADMIN — SODIUM CHLORIDE: 9 INJECTION, SOLUTION INTRAVENOUS at 11:03

## 2024-03-12 RX ADMIN — ONDANSETRON 4 MG: 2 INJECTION INTRAMUSCULAR; INTRAVENOUS at 07:03

## 2024-03-12 RX ADMIN — CITALOPRAM HYDROBROMIDE 20 MG: 20 TABLET ORAL at 09:03

## 2024-03-12 RX ADMIN — IPRATROPIUM BROMIDE AND ALBUTEROL SULFATE 3 ML: 2.5; .5 SOLUTION RESPIRATORY (INHALATION) at 07:03

## 2024-03-12 RX ADMIN — METOPROLOL TARTRATE 50 MG: 50 TABLET, FILM COATED ORAL at 08:03

## 2024-03-12 RX ADMIN — THIAMINE HYDROCHLORIDE 100 MG: 100 INJECTION, SOLUTION INTRAMUSCULAR; INTRAVENOUS at 01:03

## 2024-03-12 RX ADMIN — OXYBUTYNIN CHLORIDE 10 MG: 5 TABLET, EXTENDED RELEASE ORAL at 08:03

## 2024-03-12 RX ADMIN — APIXABAN 2.5 MG: 2.5 TABLET, FILM COATED ORAL at 09:03

## 2024-03-12 RX ADMIN — CEFTRIAXONE 2 G: 2 INJECTION, POWDER, FOR SOLUTION INTRAMUSCULAR; INTRAVENOUS at 05:03

## 2024-03-12 RX ADMIN — SODIUM CHLORIDE: 9 INJECTION, SOLUTION INTRAVENOUS at 12:03

## 2024-03-12 RX ADMIN — SODIUM CHLORIDE: 9 INJECTION, SOLUTION INTRAVENOUS at 10:03

## 2024-03-12 RX ADMIN — ACETAMINOPHEN 650 MG: 325 TABLET ORAL at 06:03

## 2024-03-12 RX ADMIN — CITALOPRAM HYDROBROMIDE 20 MG: 20 TABLET ORAL at 08:03

## 2024-03-12 RX ADMIN — AZITHROMYCIN MONOHYDRATE 500 MG: 500 INJECTION, POWDER, LYOPHILIZED, FOR SOLUTION INTRAVENOUS at 09:03

## 2024-03-12 RX ADMIN — APIXABAN 2.5 MG: 2.5 TABLET, FILM COATED ORAL at 08:03

## 2024-03-12 RX ADMIN — METOPROLOL TARTRATE 50 MG: 50 TABLET, FILM COATED ORAL at 09:03

## 2024-03-12 NOTE — H&P
[facility] Medicine  History & Physical    Patient Name: Garima Fraser  MRN: [id number]  Patient Class: IP- Inpatient  Admission Date: 3/11/2024  Attending Physician: Eleanor Orlando,*   Primary Care Provider: Sherie Torres MD         Patient information was obtained from patient and ER records.     Subjective:     Principal Problem:Community acquired pneumonia    Chief Complaint: No chief complaint on file.       HPI: This is an 88-year-old female with a past medical history of asthma, AFib (on Eliquis), hypertension, HFpEF (EF: 55%) who presents with shortness of breath.     Patient presented to [facility] ED with shortness of breath. She reports sudden onset dyspnea that started on the night of presentation. She woke up from sleep and experienced respiratory distress which made her anxious. She also has a productive cough with green sputum that started several weeks prior, and was prescribed a Z-pack for on 2/22. Associated symptoms include generalized weakness. No urinary symptoms.     In the ED, the patient was tachypneic, tachycardic (120s) and was placed on 4 L O2.  Labs were remarkable for leukocytosis (23.6), elevated lactic acid (2.5 > 6.6).  UA showed positive nitrites, 5 RBCs, 20 WBCs and few bacteria.  CTA chest showed no PE, with minimal linear atelectasis at the anterior left lung base within the lingular segment left upper lobe and mild mosaic perfusion pattern throughout the lungs which could be secondary to chronic pulmonary vascular or airways process. .  Patient was given 500 mL of NS, ceftriaxone 1 g IV, DuoNeb x2, hydralazine 10 mg IV.  Family requested transfer to [facility] as it is closer to home.  She was admitted for further management.    Past Medical History:   Diagnosis Date    After-cataract, obscuring vision 01/14/2015    Asthma     Atrial fibrillation     GERD (gastroesophageal reflux disease)     Hearing loss      Osteoporosis     Shingles     Spinal stenosis        Past Surgical History:   Procedure Laterality Date    ABDOMINAL SURGERY      CATARACT EXTRACTION Right 06/29/2010    CATARACT EXTRACTION Left 06/15/2010    CHOLECYSTECTOMY      CHOLECYSTECTOMY      HYSTERECTOMY         Review of patient's allergies indicates:   Allergen Reactions    Adhesive Rash       Current Facility-Administered Medications on File Prior to Encounter   Medication    [COMPLETED] 0.9%  NaCl infusion    [COMPLETED] 0.9%  NaCl infusion    [COMPLETED] albuterol-ipratropium 2.5 mg-0.5 mg/3 mL nebulizer solution 9 mL    [COMPLETED] albuterol-ipratropium 2.5 mg-0.5 mg/3 mL nebulizer solution 9 mL    [COMPLETED] cefTRIAXone (Rocephin) 1 g in dextrose 5 % in water (D5W) 100 mL IVPB (MB+)    [COMPLETED] hydrALAZINE injection 10 mg    [COMPLETED] iohexoL (OMNIPAQUE 350) injection 100 mL     Current Outpatient Medications on File Prior to Encounter   Medication Sig    acetaminophen-codeine 300-30mg (TYLENOL #3) 300-30 mg Tab TK 1 T PO  TID    albuterol 90 mcg/actuation inhaler Inhale 2 puffs into the lungs every 6 (six) hours as needed for Wheezing.    ALPRAZolam (XANAX) 0.25 MG tablet Take 0.25 mg by mouth 2 (two) times a day.    apixaban 5 mg Tab Take 1 tablet (5 mg total) by mouth 2 (two) times daily. (Patient taking differently: Take 2.5 mg by mouth 2 (two) times daily.)    citalopram (CELEXA) 20 MG tablet Take 20 mg by mouth 2 (two) times daily.     metoprolol tartrate (LOPRESSOR) 50 MG tablet Take 50 mg by mouth 2 (two) times daily.    oxybutynin (DITROPAN-XL) 10 MG 24 hr tablet Take 10 mg by mouth once daily.    SYMBICORT 80-4.5 mcg/actuation HFAA Inhale 2 puffs into the lungs 2 (two) times daily as needed.    alendronate (FOSAMAX) 10 MG Tab Take 10 mg by mouth once daily.    BREO ELLIPTA 100-25 mcg/dose diskus inhaler     furosemide (LASIX) 40 MG tablet One po daily x 5 days then 1/2 daily    hydrocodone-acetaminophen 5-325mg (NORCO) 5-325 mg per  tablet Take 1 tablet by mouth every 6 (six) hours as needed for Pain.    lidocaine-prilocaine (EMLA) cream Apply topically 2 (two) times daily as needed.    omeprazole (PRILOSEC) 20 MG capsule Take 20 mg by mouth 2 (two) times daily.    [DISCONTINUED] amitriptyline (ELAVIL) 50 MG tablet Take 1 tablet (50 mg total) by mouth every evening. (Patient not taking: Reported on 3/11/2024)    [DISCONTINUED] apixaban (ELIQUIS) 2.5 mg Tab Take 2.5 mg by mouth once daily.    [DISCONTINUED] apixaban 5 mg Tab Take 1 tablet (5 mg total) by mouth 2 (two) times daily. (Patient taking differently: Take 2.5 mg by mouth 2 (two) times daily.)    [DISCONTINUED] aspirin 81 MG Chew Take 81 mg by mouth once daily.    [DISCONTINUED] mometasone-formoterol (DULERA) 100-5 mcg/actuation HFAA Inhale into the lungs 2 (two) times daily.    [DISCONTINUED] tolterodine (DETROL LA) 4 MG 24 hr capsule Take 4 mg by mouth once daily.    [DISCONTINUED] warfarin (COUMADIN) 3 MG tablet TK 1 T PO QD TO THIN THE BLOOD     Family History       Problem Relation (Age of Onset)    Cataracts Mother, Sister    Diabetes Sister, Paternal Aunt    Glaucoma Sister    Macular degeneration Mother          Tobacco Use    Smoking status: Never    Smokeless tobacco: Not on file   Substance and Sexual Activity    Alcohol use: Not Currently     Comment: occasional    Drug use: Never    Sexual activity: Never     Review of Systems   Constitutional: Negative.    HENT: Negative.     Eyes: Negative.    Respiratory:  Positive for cough and shortness of breath.    Cardiovascular: Negative.    Gastrointestinal: Negative.    Endocrine: Negative.    Genitourinary: Negative.    Musculoskeletal: Negative.    Skin: Negative.    Allergic/Immunologic: Negative.    Neurological: Negative.    Psychiatric/Behavioral:  The patient is nervous/anxious.      Objective:     Vital Signs (Most Recent):  Temp: 97.6 °F (36.4 °C) (03/11/24 2034)  Pulse: 110 (03/11/24 2110)  Resp: 20 (03/11/24 2110)  BP:  (!) 142/63 (03/11/24 2034)  SpO2: 100 % (03/11/24 2110) Vital Signs (24h Range):  Temp:  [97.6 °F (36.4 °C)-97.9 °F (36.6 °C)] 97.6 °F (36.4 °C)  Pulse:  [] 110  Resp:  [16-45] 20  SpO2:  [91 %-100 %] 100 %  BP: (126-213)/(59-99) 142/63     Weight: 80.6 kg (177 lb 11.1 oz)  Body mass index is 35.89 kg/m².     Physical Exam  Vitals and nursing note reviewed.   Constitutional:       General: She is not in acute distress.     Appearance: Normal appearance. She is not ill-appearing.   HENT:      Head: Normocephalic and atraumatic.      Nose: Nose normal.      Mouth/Throat:      Mouth: Mucous membranes are moist.   Eyes:      Extraocular Movements: Extraocular movements intact.   Cardiovascular:      Rate and Rhythm: Normal rate.      Pulses: Normal pulses.      Heart sounds: No murmur heard.  Pulmonary:      Effort: Pulmonary effort is normal. No respiratory distress.      Breath sounds: Decreased breath sounds present.   Abdominal:      General: Abdomen is flat.      Palpations: Abdomen is soft.      Tenderness: There is no abdominal tenderness.   Musculoskeletal:      Right lower leg: No edema.      Left lower leg: No edema.   Skin:     General: Skin is warm.      Capillary Refill: Capillary refill takes less than 2 seconds.   Neurological:      General: No focal deficit present.      Mental Status: She is alert.   Psychiatric:      Comments: Anxious appearing .                 Significant Labs: All pertinent labs within the past 24 hours have been reviewed.    Significant Imaging: I have reviewed all pertinent imaging results/findings within the past 24 hours.  Assessment/Plan:     * Community acquired pneumonia  Patient presents with shortness of breath, cough   Labs are remarkable for leukocytosis and lactic acidosis   CTA chest showed no PE, with minimal linear atelectasis at the anterior left lung base within the lingular segment left upper lobe and mild mosaic perfusion pattern throughout the lungs which  could be secondary to chronic pulmonary vascular or airways process.   Continue ceftriaxone/azithromycin   Respiratory cultures   Symptomatic control     Severe sepsis  This patient does have evidence of infective focus  My overall impression is sepsis.  Source: Respiratory  Antibiotics given-   Antibiotics (72h ago, onward)      Start     Stop Route Frequency Ordered    03/12/24 1600  cefTRIAXone (ROCEPHIN) 2 g in dextrose 5 % in water (D5W) 100 mL IVPB (MB+)  (Order Panel)         -- IV Every 24 hours (non-standard times) 03/11/24 2124 03/11/24 2230  azithromycin (ZITHROMAX) 500 mg in dextrose 5 % (D5W) 250 mL IVPB (Vial-Mate)  (Order Panel)         -- IV Every 24 hours (non-standard times) 03/11/24 2124          Latest lactate reviewed-  Recent Labs   Lab 03/11/24  1700   LACTATE 6.6*     Organ dysfunction indicated by  N/A    Fluid challenge Not needed - patient is not hypotensive      Post- resuscitation assessment No - Post resuscitation assessment not needed     LA is likely related to an infectious process. Other differentials: could be secondary to work of breath/Duo-Nebs.     Will Not start Pressors- Levophed for MAP of 65  Source control achieved by: Abx    Anxiety  Resume home Celexa, Xanax       Chronic heart failure with preserved ejection fraction  NT-proBNP was negative.   Doubt exacerbation    Asthma  Doubt exacerbation.   Duo-Nebs       Atrial fibrillation  [id number] Score: 3. Continue Eliquis, metoprolol.       VTE Risk Mitigation (From admission, onward)           Ordered     apixaban tablet 2.5 mg  2 times daily         03/11/24 2124     IP VTE HIGH RISK PATIENT  Once         03/11/24 2028     Place sequential compression device  Until discontinued         03/11/24 2028                        Spencer Le MD  Department of Hospital Medicine  [facility]

## 2024-03-12 NOTE — PROGRESS NOTES
[facility] Medicine  Progress Note    Patient Name: Garima Fraser  MRN: [id number]  Patient Class: IP- Inpatient   Admission Date: 3/11/2024  Length of Stay: 1 days  Attending Physician: Saurabh Rosa MD  Primary Care Provider: Sherie Torres MD        Subjective:     Principal Problem:Community acquired pneumonia        HPI:  This is an 88-year-old female with a past medical history of asthma, AFib (on Eliquis), hypertension, HFpEF (EF: 55%) who presents with shortness of breath.     Patient presented to [facility] ED with shortness of breath. She reports sudden onset dyspnea that started on the night of presentation. She woke up from sleep and experienced respiratory distress which made her anxious. She also has a productive cough with green sputum that started several weeks prior, and was prescribed a Z-pack for on 2/22. Associated symptoms include generalized weakness. No urinary symptoms.     In the ED, the patient was tachypneic, tachycardic (120s) and was placed on 4 L O2.  Labs were remarkable for leukocytosis (23.6), elevated lactic acid (2.5 > 6.6).  UA showed positive nitrites, 5 RBCs, 20 WBCs and few bacteria.  CTA chest showed no PE, with minimal linear atelectasis at the anterior left lung base within the lingular segment left upper lobe and mild mosaic perfusion pattern throughout the lungs which could be secondary to chronic pulmonary vascular or airways process. .  Patient was given 500 mL of NS, ceftriaxone 1 g IV, DuoNeb x2, hydralazine 10 mg IV.  Family requested transfer to [facility] as it is closer to home.  She was admitted for further management.    Overview/Hospital Course:  Admitted with sepsis and hypoxemic respiratory failure secondary to asthma exacerbation and concerns for bacterial infection. Started on antibiotics, steroids and supplemental oxygen.    Interval History: feeling better today, still short of breath some but moving  around better per granddaughter.    Review of Systems  Objective:     Vital Signs (Most Recent):  Temp: 98.1 °F (36.7 °C) (03/12/24 1126)  Pulse: 77 (03/12/24 1126)  Resp: 18 (03/12/24 1126)  BP: 107/75 (03/12/24 1126)  SpO2: 95 % (03/12/24 1126) Vital Signs (24h Range):  Temp:  [97.6 °F (36.4 °C)-98.4 °F (36.9 °C)] 98.1 °F (36.7 °C)  Pulse:  [] 77  Resp:  [16-45] 18  SpO2:  [91 %-100 %] 95 %  BP: (107-156)/(55-79) 107/75     Weight: 80.6 kg (177 lb 11.1 oz)  Body mass index is 35.89 kg/m².    Intake/Output Summary (Last 24 hours) at 3/12/2024 1128  Last data filed at 3/12/2024 0715  Gross per 24 hour   Intake 720.94 ml   Output 250 ml   Net 470.94 ml         Physical Exam  Vitals and nursing note reviewed.   Constitutional:       Appearance: She is ill-appearing.   Cardiovascular:      Rate and Rhythm: Normal rate and regular rhythm.      Pulses: Normal pulses.   Pulmonary:      Effort: Pulmonary effort is normal.      Breath sounds: No wheezing.   Abdominal:      General: Bowel sounds are normal. There is no distension.   Skin:     General: Skin is warm.   Neurological:      Mental Status: She is alert and oriented to person, place, and time. Mental status is at baseline.   Psychiatric:         Mood and Affect: Mood normal.         Thought Content: Thought content normal.             Significant Labs: All pertinent labs within the past 24 hours have been reviewed.    Significant Imaging: I have reviewed all pertinent imaging results/findings within the past 24 hours.    Assessment/Plan:      * Community acquired pneumonia  Patient presents with shortness of breath, cough   Labs are remarkable for leukocytosis and lactic acidosis   CTA chest showed no PE, with minimal linear atelectasis at the anterior left lung base within the lingular segment left upper lobe and mild mosaic perfusion pattern throughout the lungs which could be secondary to chronic pulmonary vascular or airways process.   Continue  ceftriaxone/azithromycin   Respiratory cultures - if able to produce sputum  Symptomatic control       Severe sepsis  This patient does have evidence of infective focus  My overall impression is sepsis.  Source: Respiratory  Antibiotics given-   Antibiotics (72h ago, onward)      Start     Stop Route Frequency Ordered    03/12/24 1600  cefTRIAXone (ROCEPHIN) 2 g in dextrose 5 % in water (D5W) 100 mL IVPB (MB+)  (Order Panel)         -- IV Every 24 hours (non-standard times) 03/11/24 2124 03/11/24 2230  azithromycin (ZITHROMAX) 500 mg in dextrose 5 % (D5W) 250 mL IVPB (Vial-Mate)  (Order Panel)         -- IV Every 24 hours (non-standard times) 03/11/24 2124          Latest lactate reviewed-  Recent Labs   Lab 03/12/24  0412   LACTATE 0.8       Organ dysfunction indicated by  N/A    Fluid challenge Not needed - patient is not hypotensive      Post- resuscitation assessment No - Post resuscitation assessment not needed     LA is likely related to an infectious process. Other differentials: could be secondary to work of breath/Duo-Nebs.     Will Not start Pressors- Levophed for MAP of 65  Source control achieved by: Abx    Anxiety  Resume home Celexa, Xanax       Chronic heart failure with preserved ejection fraction  NT-proBNP was negative.   Doubt exacerbation    Asthma  Doubt exacerbation.   Duo-Nebs       Atrial fibrillation  [id number] Score: 3. Continue Eliquis, metoprolol.       VTE Risk Mitigation (From admission, onward)           Ordered     apixaban tablet 2.5 mg  2 times daily         03/11/24 2124     IP VTE HIGH RISK PATIENT  Once         03/11/24 2028     Place sequential compression device  Until discontinued         03/11/24 2028                    Discharge Planning   CORBY:      Code Status: Full Code   Is the patient medically ready for discharge?:     Reason for patient still in hospital (select all that apply): Treatment  Discharge Plan A: Home with family                  Saurabh Rosa,  MD  Department of Hospital Medicine   [facility] - Telemetry

## 2024-03-12 NOTE — NURSING
[facility], [facility]  Nurses Note -- 4 Eyes      3/11/2024       Skin assessed on: Admit      [x] No Pressure Injuries Present    []Prevention Measures Documented    [] Yes LDA  for Pressure Injury Previously documented     [] Yes New Pressure Injury Discovered   [] LDA for New Pressure Injury Added      Attending RN:  Adrianne Kaminski RN     Second RN:  Dyllan Johns RN

## 2024-03-12 NOTE — ASSESSMENT & PLAN NOTE
This patient does have evidence of infective focus  My overall impression is sepsis.  Source: Respiratory  Antibiotics given-   Antibiotics (72h ago, onward)    Start     Stop Route Frequency Ordered    03/12/24 1600  cefTRIAXone (ROCEPHIN) 2 g in dextrose 5 % in water (D5W) 100 mL IVPB (MB+)  (Order Panel)         -- IV Every 24 hours (non-standard times) 03/11/24 2124 03/11/24 2230  azithromycin (ZITHROMAX) 500 mg in dextrose 5 % (D5W) 250 mL IVPB (Vial-Mate)  (Order Panel)         -- IV Every 24 hours (non-standard times) 03/11/24 2124        Latest lactate reviewed-  Recent Labs   Lab 03/12/24  0412   LACTATE 0.8       Organ dysfunction indicated by  N/A    Fluid challenge Not needed - patient is not hypotensive      Post- resuscitation assessment No - Post resuscitation assessment not needed     LA is likely related to an infectious process. Other differentials: could be secondary to work of breath/Duo-Nebs.     Will Not start Pressors- Levophed for MAP of 65  Source control achieved by: Abx

## 2024-03-12 NOTE — NURSING
[facility], [facility]  Nurses Note -- 4 Eyes      3/12/2024       Skin assessed on: Q Shift      [x] No Pressure Injuries Present    [x]Prevention Measures Documented    [] Yes LDA  for Pressure Injury Previously documented     [] Yes New Pressure Injury Discovered   [] LDA for New Pressure Injury Added      Attending RN:  Aixa Sidhu LPN     Second RN:  Adrianne

## 2024-03-12 NOTE — HPI
This is an 88-year-old female with a past medical history of asthma, AFib (on Eliquis), hypertension, HFpEF (EF: 55%) who presents with shortness of breath.     Patient presented to [facility] ED with shortness of breath. She reports sudden onset dyspnea that started on the night of presentation. She woke up from sleep and experienced respiratory distress which made her anxious. She also has a productive cough with green sputum that started several weeks prior, and was prescribed a Z-pack for on 2/22. Associated symptoms include generalized weakness. No urinary symptoms.     In the ED, the patient was tachypneic, tachycardic (120s) and was placed on 4 L O2.  Labs were remarkable for leukocytosis (23.6), elevated lactic acid (2.5 > 6.6).  UA showed positive nitrites, 5 RBCs, 20 WBCs and few bacteria.  CTA chest showed no PE, with minimal linear atelectasis at the anterior left lung base within the lingular segment left upper lobe and mild mosaic perfusion pattern throughout the lungs which could be secondary to chronic pulmonary vascular or airways process. .  Patient was given 500 mL of NS, ceftriaxone 1 g IV, DuoNeb x2, hydralazine 10 mg IV.  Family requested transfer to [facility] as it is closer to home.  She was admitted for further management.

## 2024-03-12 NOTE — SUBJECTIVE & OBJECTIVE
Past Medical History:   Diagnosis Date    After-cataract, obscuring vision 01/14/2015    Asthma     Atrial fibrillation     GERD (gastroesophageal reflux disease)     Hearing loss     Osteoporosis     Shingles     Spinal stenosis        Past Surgical History:   Procedure Laterality Date    ABDOMINAL SURGERY      CATARACT EXTRACTION Right 06/29/2010    CATARACT EXTRACTION Left 06/15/2010    CHOLECYSTECTOMY      CHOLECYSTECTOMY      HYSTERECTOMY         Review of patient's allergies indicates:   Allergen Reactions    Adhesive Rash       Current Facility-Administered Medications on File Prior to Encounter   Medication    [COMPLETED] 0.9%  NaCl infusion    [COMPLETED] 0.9%  NaCl infusion    [COMPLETED] albuterol-ipratropium 2.5 mg-0.5 mg/3 mL nebulizer solution 9 mL    [COMPLETED] albuterol-ipratropium 2.5 mg-0.5 mg/3 mL nebulizer solution 9 mL    [COMPLETED] cefTRIAXone (Rocephin) 1 g in dextrose 5 % in water (D5W) 100 mL IVPB (MB+)    [COMPLETED] hydrALAZINE injection 10 mg    [COMPLETED] iohexoL (OMNIPAQUE 350) injection 100 mL     Current Outpatient Medications on File Prior to Encounter   Medication Sig    acetaminophen-codeine 300-30mg (TYLENOL #3) 300-30 mg Tab TK 1 T PO  TID    albuterol 90 mcg/actuation inhaler Inhale 2 puffs into the lungs every 6 (six) hours as needed for Wheezing.    ALPRAZolam (XANAX) 0.25 MG tablet Take 0.25 mg by mouth 2 (two) times a day.    apixaban 5 mg Tab Take 1 tablet (5 mg total) by mouth 2 (two) times daily. (Patient taking differently: Take 2.5 mg by mouth 2 (two) times daily.)    citalopram (CELEXA) 20 MG tablet Take 20 mg by mouth 2 (two) times daily.     metoprolol tartrate (LOPRESSOR) 50 MG tablet Take 50 mg by mouth 2 (two) times daily.    oxybutynin (DITROPAN-XL) 10 MG 24 hr tablet Take 10 mg by mouth once daily.    SYMBICORT 80-4.5 mcg/actuation HFAA Inhale 2 puffs into the lungs 2 (two) times daily as needed.    alendronate (FOSAMAX) 10 MG Tab Take 10 mg by mouth once  daily.    BREO ELLIPTA 100-25 mcg/dose diskus inhaler     furosemide (LASIX) 40 MG tablet One po daily x 5 days then 1/2 daily    hydrocodone-acetaminophen 5-325mg (NORCO) 5-325 mg per tablet Take 1 tablet by mouth every 6 (six) hours as needed for Pain.    lidocaine-prilocaine (EMLA) cream Apply topically 2 (two) times daily as needed.    omeprazole (PRILOSEC) 20 MG capsule Take 20 mg by mouth 2 (two) times daily.    [DISCONTINUED] amitriptyline (ELAVIL) 50 MG tablet Take 1 tablet (50 mg total) by mouth every evening. (Patient not taking: Reported on 3/11/2024)    [DISCONTINUED] apixaban (ELIQUIS) 2.5 mg Tab Take 2.5 mg by mouth once daily.    [DISCONTINUED] apixaban 5 mg Tab Take 1 tablet (5 mg total) by mouth 2 (two) times daily. (Patient taking differently: Take 2.5 mg by mouth 2 (two) times daily.)    [DISCONTINUED] aspirin 81 MG Chew Take 81 mg by mouth once daily.    [DISCONTINUED] mometasone-formoterol (DULERA) 100-5 mcg/actuation HFAA Inhale into the lungs 2 (two) times daily.    [DISCONTINUED] tolterodine (DETROL LA) 4 MG 24 hr capsule Take 4 mg by mouth once daily.    [DISCONTINUED] warfarin (COUMADIN) 3 MG tablet TK 1 T PO QD TO THIN THE BLOOD     Family History       Problem Relation (Age of Onset)    Cataracts Mother, Sister    Diabetes Sister, Paternal Aunt    Glaucoma Sister    Macular degeneration Mother          Tobacco Use    Smoking status: Never    Smokeless tobacco: Not on file   Substance and Sexual Activity    Alcohol use: Not Currently     Comment: occasional    Drug use: Never    Sexual activity: Never     Review of Systems   Constitutional: Negative.    HENT: Negative.     Eyes: Negative.    Respiratory:  Positive for cough and shortness of breath.    Cardiovascular: Negative.    Gastrointestinal: Negative.    Endocrine: Negative.    Genitourinary: Negative.    Musculoskeletal: Negative.    Skin: Negative.    Allergic/Immunologic: Negative.    Neurological: Negative.     Psychiatric/Behavioral:  The patient is nervous/anxious.      Objective:     Vital Signs (Most Recent):  Temp: 97.6 °F (36.4 °C) (03/11/24 2034)  Pulse: 110 (03/11/24 2110)  Resp: 20 (03/11/24 2110)  BP: (!) 142/63 (03/11/24 2034)  SpO2: 100 % (03/11/24 2110) Vital Signs (24h Range):  Temp:  [97.6 °F (36.4 °C)-97.9 °F (36.6 °C)] 97.6 °F (36.4 °C)  Pulse:  [] 110  Resp:  [16-45] 20  SpO2:  [91 %-100 %] 100 %  BP: (126-213)/(59-99) 142/63     Weight: 80.6 kg (177 lb 11.1 oz)  Body mass index is 35.89 kg/m².     Physical Exam  Vitals and nursing note reviewed.   Constitutional:       General: She is not in acute distress.     Appearance: Normal appearance. She is not ill-appearing.   HENT:      Head: Normocephalic and atraumatic.      Nose: Nose normal.      Mouth/Throat:      Mouth: Mucous membranes are moist.   Eyes:      Extraocular Movements: Extraocular movements intact.   Cardiovascular:      Rate and Rhythm: Normal rate.      Pulses: Normal pulses.      Heart sounds: No murmur heard.  Pulmonary:      Effort: Pulmonary effort is normal. No respiratory distress.      Breath sounds: Decreased breath sounds present.   Abdominal:      General: Abdomen is flat.      Palpations: Abdomen is soft.      Tenderness: There is no abdominal tenderness.   Musculoskeletal:      Right lower leg: No edema.      Left lower leg: No edema.   Skin:     General: Skin is warm.      Capillary Refill: Capillary refill takes less than 2 seconds.   Neurological:      General: No focal deficit present.      Mental Status: She is alert.   Psychiatric:      Comments: Anxious appearing .                 Significant Labs: All pertinent labs within the past 24 hours have been reviewed.    Significant Imaging: I have reviewed all pertinent imaging results/findings within the past 24 hours.

## 2024-03-12 NOTE — HOSPITAL COURSE
Ms. Fraser was admitted with sepsis and hypoxemic respiratory failure secondary to pneumonia. She was started on IV antibiotics and nebulizers.  WBC slowly trending down and feeling better each day.  PT/OT consulted.  Requesting nebulizers for home.  Started back on home Lasix and able to wean off oxygen prior to discharge.  Overall feeling much better and stable for discharge home.  Home health set up for patient.  Nebulizer and bedside commode ordered as well.  Discussed plan of care with patient and family.  She will be discharged home on p.o. antibiotics to complete a 7 day course.  She will need to follow up with her PCP within the next 1-2 weeks.  Patient discharged home in stable condition.

## 2024-03-12 NOTE — SUBJECTIVE & OBJECTIVE
Interval History: feeling better today, still short of breath some but moving around better per granddaughter.    Review of Systems  Objective:     Vital Signs (Most Recent):  Temp: 98.1 °F (36.7 °C) (03/12/24 1126)  Pulse: 77 (03/12/24 1126)  Resp: 18 (03/12/24 1126)  BP: 107/75 (03/12/24 1126)  SpO2: 95 % (03/12/24 1126) Vital Signs (24h Range):  Temp:  [97.6 °F (36.4 °C)-98.4 °F (36.9 °C)] 98.1 °F (36.7 °C)  Pulse:  [] 77  Resp:  [16-45] 18  SpO2:  [91 %-100 %] 95 %  BP: (107-156)/(55-79) 107/75     Weight: 80.6 kg (177 lb 11.1 oz)  Body mass index is 35.89 kg/m².    Intake/Output Summary (Last 24 hours) at 3/12/2024 1128  Last data filed at 3/12/2024 0715  Gross per 24 hour   Intake 720.94 ml   Output 250 ml   Net 470.94 ml         Physical Exam  Vitals and nursing note reviewed.   Constitutional:       Appearance: She is ill-appearing.   Cardiovascular:      Rate and Rhythm: Normal rate and regular rhythm.      Pulses: Normal pulses.   Pulmonary:      Effort: Pulmonary effort is normal.      Breath sounds: No wheezing.   Abdominal:      General: Bowel sounds are normal. There is no distension.   Skin:     General: Skin is warm.   Neurological:      Mental Status: She is alert and oriented to person, place, and time. Mental status is at baseline.   Psychiatric:         Mood and Affect: Mood normal.         Thought Content: Thought content normal.             Significant Labs: All pertinent labs within the past 24 hours have been reviewed.    Significant Imaging: I have reviewed all pertinent imaging results/findings within the past 24 hours.

## 2024-03-12 NOTE — PLAN OF CARE
Case Management Assessment     PCP: Sherie Torres   Pharmacy: [facility] in [place] [address]    Patient Arrived From: home  Existing Help at Home: daughter    Barriers to Discharge: none    Discharge Plan:    A. Home with family   B. Home with family      SW completed initial assessment and discussed discharge planning with patient at her bedside. Patient stated that she lives with her daughter who is her main support. Patient's granddaughter Fang will provide transport for her to get home when discharge from the hospital.      03/12/24 1117   Discharge Assessment   Assessment Type Discharge Planning Assessment   Confirmed/corrected address, phone number and insurance Yes   Confirmed Demographics Correct on Facesheet   Source of Information patient   Communicated CORBY with patient/caregiver Date not available/Unable to determine   Reason For Admission Community acquired pneumonia   People in Home child(jt), adult   Do you expect to return to your current living situation? Yes   Do you have help at home or someone to help you manage your care at home? Yes   Who are your caregiver(s) and their phone number(s)? Susu Fraser (Daughter) [phone] (Mobile)   Prior to hospitilization cognitive status: Alert/Oriented   Current cognitive status: Alert/Oriented   Walking or Climbing Stairs Difficulty no   Dressing/Bathing Difficulty no   Equipment Currently Used at Home walker, rolling;cane, quad   Readmission within 30 days? Yes   Patient currently being followed by outpatient case management? No   Do you currently have service(s) that help you manage your care at home? No   Do you take prescription medications? Yes   Do you have prescription coverage? Yes   Coverage [employer]   Do you have any problems affording any of your prescribed medications? No   Is the patient taking medications as prescribed? yes   Who is going to help you get home at discharge? Susu Fraser (Daughter) [phone] (Mobile)   How do you  get to doctors appointments? car, drives self;family or friend will provide   Are you on dialysis? No   Do you take coumadin? No   Discharge Plan A Home with family   Discharge Plan B Home with family   DME Needed Upon Discharge  none   Discharge Plan discussed with: Patient   Transition of Care Barriers None   OTHER   Name(s) of People in Home Susu Fraser (Daughter) [phone] (Mobile)

## 2024-03-12 NOTE — ASSESSMENT & PLAN NOTE
This patient does have evidence of infective focus  My overall impression is sepsis.  Source: Respiratory  Antibiotics given-   Antibiotics (72h ago, onward)      Start     Stop Route Frequency Ordered    03/12/24 1600  cefTRIAXone (ROCEPHIN) 2 g in dextrose 5 % in water (D5W) 100 mL IVPB (MB+)  (Order Panel)         -- IV Every 24 hours (non-standard times) 03/11/24 2124 03/11/24 2230  azithromycin (ZITHROMAX) 500 mg in dextrose 5 % (D5W) 250 mL IVPB (Vial-Mate)  (Order Panel)         -- IV Every 24 hours (non-standard times) 03/11/24 2124          Latest lactate reviewed-  Recent Labs   Lab 03/11/24  1700   LACTATE 6.6*     Organ dysfunction indicated by  N/A    Fluid challenge Not needed - patient is not hypotensive      Post- resuscitation assessment No - Post resuscitation assessment not needed     LA is likely related to an infectious process. Other differentials: could be secondary to work of breath/Duo-Nebs.     Will Not start Pressors- Levophed for MAP of 65  Source control achieved by: Abx

## 2024-03-12 NOTE — PLAN OF CARE
Problem: Fall Injury Risk  Goal: Absence of Fall and Fall-Related Injury  Outcome: Met  Intervention: Promote Injury-Free Environment  Flowsheets (Taken 3/12/2024 0804)  Safety Promotion/Fall Prevention:   assistive device/personal item within reach   bedside commode chair   side rails raised x 3   instructed to call staff for mobility

## 2024-03-12 NOTE — ED NOTES
Attempt again to call Report called to [facility] nurse rm [phone]. On hold x 15min.

## 2024-03-12 NOTE — ASSESSMENT & PLAN NOTE
Patient presents with shortness of breath, cough   Labs are remarkable for leukocytosis and lactic acidosis   CTA chest showed no PE, with minimal linear atelectasis at the anterior left lung base within the lingular segment left upper lobe and mild mosaic perfusion pattern throughout the lungs which could be secondary to chronic pulmonary vascular or airways process.   Continue ceftriaxone/azithromycin   Respiratory cultures   Symptomatic control

## 2024-03-12 NOTE — NURSING
Incentive spirometer was placed on the bedside table. Instructions on how to perform the breathing exercises were given. Pt verbalized understanding.

## 2024-03-12 NOTE — ASSESSMENT & PLAN NOTE
Patient presents with shortness of breath, cough   Labs are remarkable for leukocytosis and lactic acidosis   CTA chest showed no PE, with minimal linear atelectasis at the anterior left lung base within the lingular segment left upper lobe and mild mosaic perfusion pattern throughout the lungs which could be secondary to chronic pulmonary vascular or airways process.   Continue ceftriaxone/azithromycin   Respiratory cultures - if able to produce sputum  Symptomatic control

## 2024-03-12 NOTE — NURSING
Removed supplemental oxygen while the patient used the restroom. Pt's O2 sats ranged from 89%-86% percent the entire time. Replaced oxygen once she was settled back into bed.

## 2024-03-13 PROBLEM — R09.02 HYPOXIA: Status: ACTIVE | Noted: 2024-03-13

## 2024-03-13 LAB
ANION GAP SERPL CALC-SCNC: 3 MMOL/L (ref 8–16)
BASOPHILS # BLD AUTO: 0.1 K/UL (ref 0–0.2)
BASOPHILS NFR BLD: 0.5 % (ref 0–1.9)
BUN SERPL-MCNC: 11 MG/DL (ref 8–23)
CALCIUM SERPL-MCNC: 9.2 MG/DL (ref 8.7–10.5)
CHLORIDE SERPL-SCNC: 113 MMOL/L (ref 95–110)
CO2 SERPL-SCNC: 23 MMOL/L (ref 23–29)
CREAT SERPL-MCNC: 0.8 MG/DL (ref 0.5–1.4)
DIFFERENTIAL METHOD BLD: ABNORMAL
EOSINOPHIL # BLD AUTO: 0.2 K/UL (ref 0–0.5)
EOSINOPHIL NFR BLD: 0.7 % (ref 0–8)
ERYTHROCYTE [DISTWIDTH] IN BLOOD BY AUTOMATED COUNT: 13.2 % (ref 11.5–14.5)
EST. GFR  (NO RACE VARIABLE): >60 ML/MIN/1.73 M^2
GLUCOSE SERPL-MCNC: 88 MG/DL (ref 70–110)
HCT VFR BLD AUTO: 41.2 % (ref 37–48.5)
HGB BLD-MCNC: 12.5 G/DL (ref 12–16)
IMM GRANULOCYTES # BLD AUTO: 0.09 K/UL (ref 0–0.04)
IMM GRANULOCYTES NFR BLD AUTO: 0.4 % (ref 0–0.5)
LYMPHOCYTES # BLD AUTO: 2.4 K/UL (ref 1–4.8)
LYMPHOCYTES NFR BLD: 11.7 % (ref 18–48)
MCH RBC QN AUTO: 26.9 PG (ref 27–31)
MCHC RBC AUTO-ENTMCNC: 30.3 G/DL (ref 32–36)
MCV RBC AUTO: 89 FL (ref 82–98)
MONOCYTES # BLD AUTO: 1.6 K/UL (ref 0.3–1)
MONOCYTES NFR BLD: 7.8 % (ref 4–15)
NEUTROPHILS # BLD AUTO: 15.9 K/UL (ref 1.8–7.7)
NEUTROPHILS NFR BLD: 78.9 % (ref 38–73)
NRBC BLD-RTO: 0 /100 WBC
PLATELET # BLD AUTO: 285 K/UL (ref 150–450)
PMV BLD AUTO: 12 FL (ref 9.2–12.9)
POTASSIUM SERPL-SCNC: 4.2 MMOL/L (ref 3.5–5.1)
RBC # BLD AUTO: 4.65 M/UL (ref 4–5.4)
SODIUM SERPL-SCNC: 139 MMOL/L (ref 136–145)
WBC # BLD AUTO: 20.2 K/UL (ref 3.9–12.7)

## 2024-03-13 PROCEDURE — 27000221 HC OXYGEN, UP TO 24 HOURS

## 2024-03-13 PROCEDURE — 87070 CULTURE OTHR SPECIMN AEROBIC: CPT | Performed by: STUDENT IN AN ORGANIZED HEALTH CARE EDUCATION/TRAINING PROGRAM

## 2024-03-13 PROCEDURE — 94760 N-INVAS EAR/PLS OXIMETRY 1: CPT

## 2024-03-13 PROCEDURE — 21400001 HC TELEMETRY ROOM

## 2024-03-13 PROCEDURE — 87205 SMEAR GRAM STAIN: CPT | Performed by: STUDENT IN AN ORGANIZED HEALTH CARE EDUCATION/TRAINING PROGRAM

## 2024-03-13 PROCEDURE — 80048 BASIC METABOLIC PNL TOTAL CA: CPT | Performed by: STUDENT IN AN ORGANIZED HEALTH CARE EDUCATION/TRAINING PROGRAM

## 2024-03-13 PROCEDURE — 36415 COLL VENOUS BLD VENIPUNCTURE: CPT | Performed by: STUDENT IN AN ORGANIZED HEALTH CARE EDUCATION/TRAINING PROGRAM

## 2024-03-13 PROCEDURE — 85025 COMPLETE CBC W/AUTO DIFF WBC: CPT | Performed by: STUDENT IN AN ORGANIZED HEALTH CARE EDUCATION/TRAINING PROGRAM

## 2024-03-13 PROCEDURE — 63600175 PHARM REV CODE 636 W HCPCS: Performed by: STUDENT IN AN ORGANIZED HEALTH CARE EDUCATION/TRAINING PROGRAM

## 2024-03-13 PROCEDURE — 94640 AIRWAY INHALATION TREATMENT: CPT

## 2024-03-13 PROCEDURE — 94761 N-INVAS EAR/PLS OXIMETRY MLT: CPT

## 2024-03-13 PROCEDURE — 99900035 HC TECH TIME PER 15 MIN (STAT)

## 2024-03-13 PROCEDURE — 25000242 PHARM REV CODE 250 ALT 637 W/ HCPCS: Performed by: STUDENT IN AN ORGANIZED HEALTH CARE EDUCATION/TRAINING PROGRAM

## 2024-03-13 PROCEDURE — 25000003 PHARM REV CODE 250: Performed by: STUDENT IN AN ORGANIZED HEALTH CARE EDUCATION/TRAINING PROGRAM

## 2024-03-13 RX ADMIN — CITALOPRAM HYDROBROMIDE 20 MG: 20 TABLET ORAL at 09:03

## 2024-03-13 RX ADMIN — ALPRAZOLAM 0.25 MG: 0.25 TABLET ORAL at 09:03

## 2024-03-13 RX ADMIN — CITALOPRAM HYDROBROMIDE 20 MG: 20 TABLET ORAL at 11:03

## 2024-03-13 RX ADMIN — APIXABAN 2.5 MG: 2.5 TABLET, FILM COATED ORAL at 09:03

## 2024-03-13 RX ADMIN — METOPROLOL TARTRATE 50 MG: 50 TABLET, FILM COATED ORAL at 09:03

## 2024-03-13 RX ADMIN — AZITHROMYCIN MONOHYDRATE 500 MG: 500 INJECTION, POWDER, LYOPHILIZED, FOR SOLUTION INTRAVENOUS at 09:03

## 2024-03-13 RX ADMIN — IPRATROPIUM BROMIDE AND ALBUTEROL SULFATE 3 ML: 2.5; .5 SOLUTION RESPIRATORY (INHALATION) at 07:03

## 2024-03-13 RX ADMIN — CEFTRIAXONE 2 G: 2 INJECTION, POWDER, FOR SOLUTION INTRAMUSCULAR; INTRAVENOUS at 04:03

## 2024-03-13 RX ADMIN — OXYBUTYNIN CHLORIDE 10 MG: 5 TABLET, EXTENDED RELEASE ORAL at 11:03

## 2024-03-13 RX ADMIN — METOPROLOL TARTRATE 50 MG: 50 TABLET, FILM COATED ORAL at 11:03

## 2024-03-13 RX ADMIN — APIXABAN 2.5 MG: 2.5 TABLET, FILM COATED ORAL at 11:03

## 2024-03-13 RX ADMIN — IPRATROPIUM BROMIDE AND ALBUTEROL SULFATE 3 ML: 2.5; .5 SOLUTION RESPIRATORY (INHALATION) at 01:03

## 2024-03-13 RX ADMIN — IPRATROPIUM BROMIDE AND ALBUTEROL SULFATE 3 ML: 2.5; .5 SOLUTION RESPIRATORY (INHALATION) at 12:03

## 2024-03-13 NOTE — ASSESSMENT & PLAN NOTE
- Patient presents with shortness of breath, cough   - Labs are remarkable for leukocytosis and lactic acidosis   - CTA chest showed no PE, with minimal linear atelectasis at the anterior left lung base within the lingular segment left upper lobe and mild mosaic perfusion pattern throughout the lungs which could be secondary to chronic pulmonary vascular or airways process.   - Continue ceftriaxone/azithromycin   - Respiratory cultures - sent on 3/13  - Symptomatic control

## 2024-03-13 NOTE — NURSING
[facility], [facility]  Nurses Note -- 4 Eyes      3/12/2024       Skin assessed on: Q Shift      [x] No Pressure Injuries Present    []Prevention Measures Documented    [] Yes LDA  for Pressure Injury Previously documented     [] Yes New Pressure Injury Discovered   [] LDA for New Pressure Injury Added      Attending RN:  Adrianne Kaminski RN     Second RN:  Aixa Sidhu RN

## 2024-03-13 NOTE — ASSESSMENT & PLAN NOTE
- requiring oxygen with underlying asthma and now with pneumonia  - wean as tolerated  - may need oxygen at discharge

## 2024-03-13 NOTE — PLAN OF CARE
Problem: Adult Inpatient Plan of Care  Goal: Plan of Care Review  Outcome: Ongoing, Progressing  Goal: Patient-Specific Goal (Individualized)  Outcome: Ongoing, Progressing  Goal: Absence of Hospital-Acquired Illness or Injury  Outcome: Ongoing, Progressing  Goal: Optimal Comfort and Wellbeing  Outcome: Ongoing, Progressing  Goal: Readiness for Transition of Care  Outcome: Ongoing, Progressing     Problem: Adjustment to Illness (Sepsis/Septic Shock)  Goal: Optimal Coping  Outcome: Ongoing, Progressing  Intervention: Optimize Psychosocial Adjustment to Illness  Flowsheets (Taken 3/13/2024 0310)  Supportive Measures: active listening utilized  Family/Support System Care: presence promoted     Problem: Glycemic Control Impaired (Sepsis/Septic Shock)  Goal: Blood Glucose Level Within Desired Range  Outcome: Ongoing, Progressing  Intervention: Optimize Glycemic Control  Flowsheets (Taken 3/13/2024 0310)  Glycemic Management: blood glucose monitored

## 2024-03-13 NOTE — SUBJECTIVE & OBJECTIVE
Interval History: feeling better, still hypoxic. Discussed results/plan with family at bedside. Had not been using incentive spirometer    Review of Systems  Objective:     Vital Signs (Most Recent):  Temp: 98.9 °F (37.2 °C) (03/13/24 0806)  Pulse: 91 (03/13/24 1005)  Resp: 18 (03/13/24 0806)  BP: (!) 153/102 (03/13/24 1005)  SpO2: (!) 94 % (03/13/24 0806) Vital Signs (24h Range):  Temp:  [97.9 °F (36.6 °C)-99.4 °F (37.4 °C)] 98.9 °F (37.2 °C)  Pulse:  [72-97] 91  Resp:  [10-22] 18  SpO2:  [90 %-97 %] 94 %  BP: (107-168)/() 153/102     Weight: 80.6 kg (177 lb 11.1 oz)  Body mass index is 35.89 kg/m².    Intake/Output Summary (Last 24 hours) at 3/13/2024 1014  Last data filed at 3/13/2024 0927  Gross per 24 hour   Intake 2481.18 ml   Output 1300 ml   Net 1181.18 ml           Physical Exam  Vitals and nursing note reviewed.   Constitutional:       Appearance: She is ill-appearing.   Cardiovascular:      Rate and Rhythm: Normal rate and regular rhythm.      Pulses: Normal pulses.   Pulmonary:      Effort: Pulmonary effort is normal.      Breath sounds: No wheezing.      Comments: Unable to continue with deep breaths, gets short of breath but clear to auscultation  Abdominal:      General: Bowel sounds are normal. There is no distension.   Skin:     General: Skin is warm.   Neurological:      Mental Status: She is alert and oriented to person, place, and time. Mental status is at baseline.   Psychiatric:         Mood and Affect: Mood normal.         Thought Content: Thought content normal.             Significant Labs: All pertinent labs within the past 24 hours have been reviewed.    Significant Imaging: I have reviewed all pertinent imaging results/findings within the past 24 hours.

## 2024-03-13 NOTE — PROGRESS NOTES
[facility] Medicine  Progress Note    Patient Name: Garima Fraser  MRN: [id number]  Patient Class: IP- Inpatient   Admission Date: 3/11/2024  Length of Stay: 2 days  Attending Physician: Saurabh Rosa MD  Primary Care Provider: Sherie Torres MD        Subjective:     Principal Problem:Community acquired pneumonia        HPI:  This is an 88-year-old female with a past medical history of asthma, AFib (on Eliquis), hypertension, HFpEF (EF: 55%) who presents with shortness of breath.     Patient presented to [facility] ED with shortness of breath. She reports sudden onset dyspnea that started on the night of presentation. She woke up from sleep and experienced respiratory distress which made her anxious. She also has a productive cough with green sputum that started several weeks prior, and was prescribed a Z-pack for on 2/22. Associated symptoms include generalized weakness. No urinary symptoms.     In the ED, the patient was tachypneic, tachycardic (120s) and was placed on 4 L O2.  Labs were remarkable for leukocytosis (23.6), elevated lactic acid (2.5 > 6.6).  UA showed positive nitrites, 5 RBCs, 20 WBCs and few bacteria.  CTA chest showed no PE, with minimal linear atelectasis at the anterior left lung base within the lingular segment left upper lobe and mild mosaic perfusion pattern throughout the lungs which could be secondary to chronic pulmonary vascular or airways process. .  Patient was given 500 mL of NS, ceftriaxone 1 g IV, DuoNeb x2, hydralazine 10 mg IV.  Family requested transfer to [facility] as it is closer to home.  She was admitted for further management.    Overview/Hospital Course:  Admitted with sepsis and hypoxemic respiratory failure secondary to asthma exacerbation and concerns for bacterial infection. Started on antibiotics, steroids and supplemental oxygen.    Interval History: feeling better, still hypoxic. Discussed results/plan with  family at bedside. Had not been using incentive spirometer    Review of Systems  Objective:     Vital Signs (Most Recent):  Temp: 98.9 °F (37.2 °C) (03/13/24 0806)  Pulse: 91 (03/13/24 1005)  Resp: 18 (03/13/24 0806)  BP: (!) 153/102 (03/13/24 1005)  SpO2: (!) 94 % (03/13/24 0806) Vital Signs (24h Range):  Temp:  [97.9 °F (36.6 °C)-99.4 °F (37.4 °C)] 98.9 °F (37.2 °C)  Pulse:  [72-97] 91  Resp:  [10-22] 18  SpO2:  [90 %-97 %] 94 %  BP: (107-168)/() 153/102     Weight: 80.6 kg (177 lb 11.1 oz)  Body mass index is 35.89 kg/m².    Intake/Output Summary (Last 24 hours) at 3/13/2024 1014  Last data filed at 3/13/2024 0927  Gross per 24 hour   Intake 2481.18 ml   Output 1300 ml   Net 1181.18 ml           Physical Exam  Vitals and nursing note reviewed.   Constitutional:       Appearance: She is ill-appearing.   Cardiovascular:      Rate and Rhythm: Normal rate and regular rhythm.      Pulses: Normal pulses.   Pulmonary:      Effort: Pulmonary effort is normal.      Breath sounds: No wheezing.      Comments: Unable to continue with deep breaths, gets short of breath but clear to auscultation  Abdominal:      General: Bowel sounds are normal. There is no distension.   Skin:     General: Skin is warm.   Neurological:      Mental Status: She is alert and oriented to person, place, and time. Mental status is at baseline.   Psychiatric:         Mood and Affect: Mood normal.         Thought Content: Thought content normal.             Significant Labs: All pertinent labs within the past 24 hours have been reviewed.    Significant Imaging: I have reviewed all pertinent imaging results/findings within the past 24 hours.    Assessment/Plan:      * Community acquired pneumonia  - Patient presents with shortness of breath, cough   - Labs are remarkable for leukocytosis and lactic acidosis   - CTA chest showed no PE, with minimal linear atelectasis at the anterior left lung base within the lingular segment left upper lobe and  mild mosaic perfusion pattern throughout the lungs which could be secondary to chronic pulmonary vascular or airways process.   - Continue ceftriaxone/azithromycin   - Respiratory cultures - sent on 3/13  - Symptomatic control       Hypoxia  - requiring oxygen with underlying asthma and now with pneumonia  - wean as tolerated  - may need oxygen at discharge      Severe sepsis  This patient does have evidence of infective focus  My overall impression is sepsis.  Source: Respiratory  Antibiotics given-   Antibiotics (72h ago, onward)      Start     Stop Route Frequency Ordered    03/12/24 1600  cefTRIAXone (ROCEPHIN) 2 g in dextrose 5 % in water (D5W) 100 mL IVPB (MB+)  (Order Panel)         -- IV Every 24 hours (non-standard times) 03/11/24 2124 03/11/24 2230  azithromycin (ZITHROMAX) 500 mg in dextrose 5 % (D5W) 250 mL IVPB (Vial-Mate)  (Order Panel)         -- IV Every 24 hours (non-standard times) 03/11/24 2124          Latest lactate reviewed-  Recent Labs   Lab 03/12/24  0412   LACTATE 0.8       Organ dysfunction indicated by  N/A    Fluid challenge Not needed - patient is not hypotensive      Post- resuscitation assessment No - Post resuscitation assessment not needed     LA is likely related to an infectious process. Other differentials: could be secondary to work of breath/Duo-Nebs.     Will Not start Pressors- Levophed for MAP of 65  Source control achieved by: Abx    Anxiety  Resume home Celexa, Xanax       Chronic heart failure with preserved ejection fraction  NT-proBNP was negative.   Doubt exacerbation    Asthma  Doubt exacerbation.   Duo-Nebs       Atrial fibrillation  [id number] Score: 3. Continue Eliquis, metoprolol.       VTE Risk Mitigation (From admission, onward)           Ordered     apixaban tablet 2.5 mg  2 times daily         03/11/24 2124     IP VTE HIGH RISK PATIENT  Once         03/11/24 2028     Place sequential compression device  Until discontinued         03/11/24 2028                     Discharge Planning   CORBY:      Code Status: Full Code   Is the patient medically ready for discharge?:     Reason for patient still in hospital (select all that apply): Treatment  Discharge Plan A: Home with family                  Saurabh Rosa MD  Department of [facility] Medicine   [facility]

## 2024-03-14 LAB
ANION GAP SERPL CALC-SCNC: 11 MMOL/L (ref 8–16)
BACTERIA UR CULT: ABNORMAL
BASOPHILS # BLD AUTO: 0.09 K/UL (ref 0–0.2)
BASOPHILS NFR BLD: 0.5 % (ref 0–1.9)
BUN SERPL-MCNC: 10 MG/DL (ref 8–23)
CALCIUM SERPL-MCNC: 9.3 MG/DL (ref 8.7–10.5)
CHLORIDE SERPL-SCNC: 107 MMOL/L (ref 95–110)
CO2 SERPL-SCNC: 24 MMOL/L (ref 23–29)
CREAT SERPL-MCNC: 0.8 MG/DL (ref 0.5–1.4)
DIFFERENTIAL METHOD BLD: ABNORMAL
EOSINOPHIL # BLD AUTO: 0.3 K/UL (ref 0–0.5)
EOSINOPHIL NFR BLD: 1.6 % (ref 0–8)
ERYTHROCYTE [DISTWIDTH] IN BLOOD BY AUTOMATED COUNT: 12.8 % (ref 11.5–14.5)
EST. GFR  (NO RACE VARIABLE): >60 ML/MIN/1.73 M^2
GLUCOSE SERPL-MCNC: 95 MG/DL (ref 70–110)
HCT VFR BLD AUTO: 41.5 % (ref 37–48.5)
HGB BLD-MCNC: 12.7 G/DL (ref 12–16)
IMM GRANULOCYTES # BLD AUTO: 0.09 K/UL (ref 0–0.04)
IMM GRANULOCYTES NFR BLD AUTO: 0.5 % (ref 0–0.5)
L PNEUMO AG UR QL IA: NEGATIVE
LYMPHOCYTES # BLD AUTO: 2.1 K/UL (ref 1–4.8)
LYMPHOCYTES NFR BLD: 12.4 % (ref 18–48)
MCH RBC QN AUTO: 26.7 PG (ref 27–31)
MCHC RBC AUTO-ENTMCNC: 30.6 G/DL (ref 32–36)
MCV RBC AUTO: 87 FL (ref 82–98)
MONOCYTES # BLD AUTO: 1.5 K/UL (ref 0.3–1)
MONOCYTES NFR BLD: 8.8 % (ref 4–15)
NEUTROPHILS # BLD AUTO: 13.1 K/UL (ref 1.8–7.7)
NEUTROPHILS NFR BLD: 76.2 % (ref 38–73)
NRBC BLD-RTO: 0 /100 WBC
PLATELET # BLD AUTO: 286 K/UL (ref 150–450)
PMV BLD AUTO: 11.6 FL (ref 9.2–12.9)
POTASSIUM SERPL-SCNC: 4 MMOL/L (ref 3.5–5.1)
RBC # BLD AUTO: 4.76 M/UL (ref 4–5.4)
SODIUM SERPL-SCNC: 142 MMOL/L (ref 136–145)
WBC # BLD AUTO: 17.13 K/UL (ref 3.9–12.7)

## 2024-03-14 PROCEDURE — 36415 COLL VENOUS BLD VENIPUNCTURE: CPT | Performed by: STUDENT IN AN ORGANIZED HEALTH CARE EDUCATION/TRAINING PROGRAM

## 2024-03-14 PROCEDURE — 25000242 PHARM REV CODE 250 ALT 637 W/ HCPCS: Performed by: STUDENT IN AN ORGANIZED HEALTH CARE EDUCATION/TRAINING PROGRAM

## 2024-03-14 PROCEDURE — 80048 BASIC METABOLIC PNL TOTAL CA: CPT | Performed by: STUDENT IN AN ORGANIZED HEALTH CARE EDUCATION/TRAINING PROGRAM

## 2024-03-14 PROCEDURE — 21400001 HC TELEMETRY ROOM

## 2024-03-14 PROCEDURE — 94640 AIRWAY INHALATION TREATMENT: CPT

## 2024-03-14 PROCEDURE — 85025 COMPLETE CBC W/AUTO DIFF WBC: CPT | Performed by: STUDENT IN AN ORGANIZED HEALTH CARE EDUCATION/TRAINING PROGRAM

## 2024-03-14 PROCEDURE — 25000003 PHARM REV CODE 250: Performed by: STUDENT IN AN ORGANIZED HEALTH CARE EDUCATION/TRAINING PROGRAM

## 2024-03-14 PROCEDURE — 94761 N-INVAS EAR/PLS OXIMETRY MLT: CPT

## 2024-03-14 PROCEDURE — 94799 UNLISTED PULMONARY SVC/PX: CPT | Mod: XB

## 2024-03-14 PROCEDURE — 27000221 HC OXYGEN, UP TO 24 HOURS

## 2024-03-14 PROCEDURE — 63600175 PHARM REV CODE 636 W HCPCS: Performed by: STUDENT IN AN ORGANIZED HEALTH CARE EDUCATION/TRAINING PROGRAM

## 2024-03-14 PROCEDURE — 99900035 HC TECH TIME PER 15 MIN (STAT)

## 2024-03-14 RX ORDER — TALC
6 POWDER (GRAM) TOPICAL NIGHTLY
Status: DISCONTINUED | OUTPATIENT
Start: 2024-03-14 | End: 2024-03-16 | Stop reason: HOSPADM

## 2024-03-14 RX ORDER — FUROSEMIDE 40 MG/1
40 TABLET ORAL DAILY
Status: DISCONTINUED | OUTPATIENT
Start: 2024-03-14 | End: 2024-03-16 | Stop reason: HOSPADM

## 2024-03-14 RX ORDER — HYDRALAZINE HYDROCHLORIDE 20 MG/ML
5 INJECTION INTRAMUSCULAR; INTRAVENOUS EVERY 6 HOURS PRN
Status: DISCONTINUED | OUTPATIENT
Start: 2024-03-14 | End: 2024-03-16 | Stop reason: HOSPADM

## 2024-03-14 RX ORDER — PANTOPRAZOLE SODIUM 40 MG/1
40 TABLET, DELAYED RELEASE ORAL DAILY
Status: DISCONTINUED | OUTPATIENT
Start: 2024-03-14 | End: 2024-03-16 | Stop reason: HOSPADM

## 2024-03-14 RX ADMIN — HYDRALAZINE HYDROCHLORIDE 5 MG: 20 INJECTION INTRAMUSCULAR; INTRAVENOUS at 06:03

## 2024-03-14 RX ADMIN — CITALOPRAM HYDROBROMIDE 20 MG: 20 TABLET ORAL at 09:03

## 2024-03-14 RX ADMIN — Medication 6 MG: at 09:03

## 2024-03-14 RX ADMIN — FUROSEMIDE 40 MG: 40 TABLET ORAL at 09:03

## 2024-03-14 RX ADMIN — METOPROLOL TARTRATE 50 MG: 50 TABLET, FILM COATED ORAL at 09:03

## 2024-03-14 RX ADMIN — IPRATROPIUM BROMIDE AND ALBUTEROL SULFATE 3 ML: 2.5; .5 SOLUTION RESPIRATORY (INHALATION) at 01:03

## 2024-03-14 RX ADMIN — AZITHROMYCIN MONOHYDRATE 500 MG: 500 INJECTION, POWDER, LYOPHILIZED, FOR SOLUTION INTRAVENOUS at 09:03

## 2024-03-14 RX ADMIN — APIXABAN 2.5 MG: 2.5 TABLET, FILM COATED ORAL at 09:03

## 2024-03-14 RX ADMIN — PANTOPRAZOLE SODIUM 40 MG: 40 TABLET, DELAYED RELEASE ORAL at 11:03

## 2024-03-14 RX ADMIN — CEFTRIAXONE 2 G: 2 INJECTION, POWDER, FOR SOLUTION INTRAMUSCULAR; INTRAVENOUS at 04:03

## 2024-03-14 RX ADMIN — IPRATROPIUM BROMIDE AND ALBUTEROL SULFATE 3 ML: 2.5; .5 SOLUTION RESPIRATORY (INHALATION) at 07:03

## 2024-03-14 RX ADMIN — OXYBUTYNIN CHLORIDE 10 MG: 5 TABLET, EXTENDED RELEASE ORAL at 09:03

## 2024-03-14 RX ADMIN — IPRATROPIUM BROMIDE AND ALBUTEROL SULFATE 3 ML: 2.5; .5 SOLUTION RESPIRATORY (INHALATION) at 08:03

## 2024-03-14 RX ADMIN — IPRATROPIUM BROMIDE AND ALBUTEROL SULFATE 3 ML: 2.5; .5 SOLUTION RESPIRATORY (INHALATION) at 12:03

## 2024-03-14 NOTE — NURSING
[facility] [place]  Nurses Note -- 4 Eyes      3/13/2024       Skin assessed on: Q Shift      [x] No Pressure Injuries Present    []Prevention Measures Documented    [] Yes LDA  for Pressure Injury Previously documented     [] Yes New Pressure Injury Discovered   [] LDA for New Pressure Injury Added      Attending RN:  Adrianne Kaminski, RN     Second RN:  Yessi Morejon LPN

## 2024-03-14 NOTE — PROGRESS NOTES
[facility] Medicine  Progress Note    Patient Name: Garima Fraser  MRN: [id number]  Patient Class: IP- Inpatient   Admission Date: 3/11/2024  Length of Stay: 3 days  Attending Physician: Saurabh Rosa MD  Primary Care Provider: Sherie Torres MD        Subjective:     Principal Problem:Community acquired pneumonia        HPI:  This is an 88-year-old female with a past medical history of asthma, AFib (on Eliquis), hypertension, HFpEF (EF: 55%) who presents with shortness of breath.     Patient presented to [facility] ED with shortness of breath. She reports sudden onset dyspnea that started on the night of presentation. She woke up from sleep and experienced respiratory distress which made her anxious. She also has a productive cough with green sputum that started several weeks prior, and was prescribed a Z-pack for on 2/22. Associated symptoms include generalized weakness. No urinary symptoms.     In the ED, the patient was tachypneic, tachycardic (120s) and was placed on 4 L O2.  Labs were remarkable for leukocytosis (23.6), elevated lactic acid (2.5 > 6.6).  UA showed positive nitrites, 5 RBCs, 20 WBCs and few bacteria.  CTA chest showed no PE, with minimal linear atelectasis at the anterior left lung base within the lingular segment left upper lobe and mild mosaic perfusion pattern throughout the lungs which could be secondary to chronic pulmonary vascular or airways process. .  Patient was given 500 mL of NS, ceftriaxone 1 g IV, DuoNeb x2, hydralazine 10 mg IV.  Family requested transfer to [facility] as it is closer to home.  She was admitted for further management.    Overview/Hospital Course:  Admitted with sepsis and hypoxemic respiratory failure secondary to asthma exacerbation and concerns for bacterial infection. Started on antibiotics, steroids and supplemental oxygen.    Interval History: no acute issues, discussed working with therapy. Did not  sleep well last night    Review of Systems  Objective:     Vital Signs (Most Recent):  Temp: 98.7 °F (37.1 °C) (03/14/24 1136)  Pulse: 81 (03/14/24 1327)  Resp: (!) 21 (03/14/24 1327)  BP: (!) 142/63 (03/14/24 1136)  SpO2: 95 % (03/14/24 1327) Vital Signs (24h Range):  Temp:  [98.1 °F (36.7 °C)-98.7 °F (37.1 °C)] 98.7 °F (37.1 °C)  Pulse:  [] 81  Resp:  [16-24] 21  SpO2:  [93 %-97 %] 95 %  BP: (134-199)/(63-93) 142/63     Weight: 80.6 kg (177 lb 11.1 oz)  Body mass index is 35.89 kg/m².    Intake/Output Summary (Last 24 hours) at 3/14/2024 1415  Last data filed at 3/14/2024 1202  Gross per 24 hour   Intake --   Output 2450 ml   Net -2450 ml           Physical Exam  Vitals and nursing note reviewed.   Constitutional:       Appearance: She is ill-appearing.   Cardiovascular:      Rate and Rhythm: Normal rate and regular rhythm.      Pulses: Normal pulses.   Pulmonary:      Effort: Pulmonary effort is normal.      Breath sounds: No wheezing.      Comments: Lungs clear to auscultation but shallow  Abdominal:      General: Bowel sounds are normal. There is no distension.   Skin:     General: Skin is warm.   Neurological:      Mental Status: She is alert and oriented to person, place, and time. Mental status is at baseline.   Psychiatric:         Mood and Affect: Mood normal.         Thought Content: Thought content normal.             Significant Labs: All pertinent labs within the past 24 hours have been reviewed.    Significant Imaging: I have reviewed all pertinent imaging results/findings within the past 24 hours.    Assessment/Plan:      * Community acquired pneumonia  - Patient presents with shortness of breath, cough   - Labs are remarkable for leukocytosis and lactic acidosis   - CTA chest showed no PE, with minimal linear atelectasis at the anterior left lung base within the lingular segment left upper lobe and mild mosaic perfusion pattern throughout the lungs which could be secondary to chronic pulmonary  vascular or airways process.   - Continue ceftriaxone/azithromycin   - Respiratory cultures - sent on 3/13 with normal sincere  - Symptomatic control       Hypoxia  - requiring oxygen with underlying asthma and now with pneumonia  - wean as tolerated  - may need oxygen at discharge  - PT/OT consulted      Severe sepsis  This patient does have evidence of infective focus  My overall impression is sepsis.  Source: Respiratory  Antibiotics given-   Antibiotics (72h ago, onward)      Start     Stop Route Frequency Ordered    03/12/24 1600  cefTRIAXone (ROCEPHIN) 2 g in dextrose 5 % in water (D5W) 100 mL IVPB (MB+)  (Order Panel)         -- IV Every 24 hours (non-standard times) 03/11/24 2124 03/11/24 2230  azithromycin (ZITHROMAX) 500 mg in dextrose 5 % (D5W) 250 mL IVPB (Vial-Mate)  (Order Panel)         -- IV Every 24 hours (non-standard times) 03/11/24 2124          Latest lactate reviewed-  Recent Labs   Lab 03/12/24  0412   LACTATE 0.8       Organ dysfunction indicated by  N/A    Fluid challenge Not needed - patient is not hypotensive      Post- resuscitation assessment No - Post resuscitation assessment not needed     LA is likely related to an infectious process. Other differentials: could be secondary to work of breath/Duo-Nebs.     Will Not start Pressors- Levophed for MAP of 65  Source control achieved by: Abx    Anxiety  Resume home Celexa, Xanax       Chronic heart failure with preserved ejection fraction  NT-proBNP was negative.   Doubt exacerbation    Asthma  Doubt exacerbation.   Duo-Nebs       Atrial fibrillation  [id number] Score: 3. Continue Eliquis, metoprolol.       VTE Risk Mitigation (From admission, onward)           Ordered     apixaban tablet 2.5 mg  2 times daily         03/11/24 2124     IP VTE HIGH RISK PATIENT  Once         03/11/24 2028     Place sequential compression device  Until discontinued         03/11/24 2028                    Discharge Planning   CORBY:      Code Status: Full Code    Is the patient medically ready for discharge?:     Reason for patient still in hospital (select all that apply): Treatment  Discharge Plan A: Home with family                  Saurabh Rosa MD  Department of [facility] Medicine   [facility]

## 2024-03-14 NOTE — NURSING
Patient ask for assistance to get to her bedside commode then she verbalized feeling dizzy. BP was taken. MD informed. PRN BP Medicine ordered and given to patient.

## 2024-03-14 NOTE — PLAN OF CARE
Problem: Adult Inpatient Plan of Care  Goal: Plan of Care Review  Outcome: Ongoing, Progressing  Goal: Patient-Specific Goal (Individualized)  Outcome: Ongoing, Progressing  Goal: Absence of Hospital-Acquired Illness or Injury  Outcome: Ongoing, Progressing  Goal: Optimal Comfort and Wellbeing  Outcome: Ongoing, Progressing  Goal: Readiness for Transition of Care  Outcome: Ongoing, Progressing     Problem: Adjustment to Illness (Sepsis/Septic Shock)  Goal: Optimal Coping  Outcome: Ongoing, Progressing  Intervention: Optimize Psychosocial Adjustment to Illness  Flowsheets (Taken 3/14/2024 0318)  Supportive Measures:   active listening utilized   verbalization of feelings encouraged

## 2024-03-14 NOTE — NURSING
Notified by tele bunker patient had 17 beats Vtach at 924pm.   Nurse is with patient and no issues noted, hx Afib and being treated for PNA.   Notified JEFFREY.

## 2024-03-14 NOTE — NURSING
[facility] [place]  Nurses Note -- 4 Eyes      3/14/2024       Skin assessed on: Q Shift      [x] No Pressure Injuries Present    [x]Prevention Measures Documented    [] Yes LDA  for Pressure Injury Previously documented     [] Yes New Pressure Injury Discovered   [] LDA for New Pressure Injury Added      Attending RN:  Oneida Barnhart RN     Second RN:  Sandy Kaminski RN

## 2024-03-14 NOTE — SUBJECTIVE & OBJECTIVE
Interval History: no acute issues, discussed working with therapy. Did not sleep well last night    Review of Systems  Objective:     Vital Signs (Most Recent):  Temp: 98.7 °F (37.1 °C) (03/14/24 1136)  Pulse: 81 (03/14/24 1327)  Resp: (!) 21 (03/14/24 1327)  BP: (!) 142/63 (03/14/24 1136)  SpO2: 95 % (03/14/24 1327) Vital Signs (24h Range):  Temp:  [98.1 °F (36.7 °C)-98.7 °F (37.1 °C)] 98.7 °F (37.1 °C)  Pulse:  [] 81  Resp:  [16-24] 21  SpO2:  [93 %-97 %] 95 %  BP: (134-199)/(63-93) 142/63     Weight: 80.6 kg (177 lb 11.1 oz)  Body mass index is 35.89 kg/m².    Intake/Output Summary (Last 24 hours) at 3/14/2024 1415  Last data filed at 3/14/2024 1202  Gross per 24 hour   Intake --   Output 2450 ml   Net -2450 ml           Physical Exam  Vitals and nursing note reviewed.   Constitutional:       Appearance: She is ill-appearing.   Cardiovascular:      Rate and Rhythm: Normal rate and regular rhythm.      Pulses: Normal pulses.   Pulmonary:      Effort: Pulmonary effort is normal.      Breath sounds: No wheezing.      Comments: Lungs clear to auscultation but shallow  Abdominal:      General: Bowel sounds are normal. There is no distension.   Skin:     General: Skin is warm.   Neurological:      Mental Status: She is alert and oriented to person, place, and time. Mental status is at baseline.   Psychiatric:         Mood and Affect: Mood normal.         Thought Content: Thought content normal.             Significant Labs: All pertinent labs within the past 24 hours have been reviewed.    Significant Imaging: I have reviewed all pertinent imaging results/findings within the past 24 hours.

## 2024-03-14 NOTE — ASSESSMENT & PLAN NOTE
- Patient presents with shortness of breath, cough   - Labs are remarkable for leukocytosis and lactic acidosis   - CTA chest showed no PE, with minimal linear atelectasis at the anterior left lung base within the lingular segment left upper lobe and mild mosaic perfusion pattern throughout the lungs which could be secondary to chronic pulmonary vascular or airways process.   - Continue ceftriaxone/azithromycin   - Respiratory cultures - sent on 3/13 with normal sincere  - Symptomatic control

## 2024-03-14 NOTE — ASSESSMENT & PLAN NOTE
- requiring oxygen with underlying asthma and now with pneumonia  - wean as tolerated  - may need oxygen at discharge  - PT/OT consulted

## 2024-03-15 LAB
ANION GAP SERPL CALC-SCNC: 8 MMOL/L (ref 8–16)
BACTERIA SPEC AEROBE CULT: NORMAL
BASOPHILS # BLD AUTO: 0.1 K/UL (ref 0–0.2)
BASOPHILS NFR BLD: 0.8 % (ref 0–1.9)
BUN SERPL-MCNC: 13 MG/DL (ref 8–23)
CALCIUM SERPL-MCNC: 9.2 MG/DL (ref 8.7–10.5)
CHLORIDE SERPL-SCNC: 106 MMOL/L (ref 95–110)
CO2 SERPL-SCNC: 26 MMOL/L (ref 23–29)
CREAT SERPL-MCNC: 0.8 MG/DL (ref 0.5–1.4)
DIFFERENTIAL METHOD BLD: ABNORMAL
EOSINOPHIL # BLD AUTO: 0.2 K/UL (ref 0–0.5)
EOSINOPHIL NFR BLD: 1.8 % (ref 0–8)
ERYTHROCYTE [DISTWIDTH] IN BLOOD BY AUTOMATED COUNT: 12.8 % (ref 11.5–14.5)
EST. GFR  (NO RACE VARIABLE): >60 ML/MIN/1.73 M^2
GLUCOSE SERPL-MCNC: 96 MG/DL (ref 70–110)
GRAM STN SPEC: NORMAL
HCT VFR BLD AUTO: 41.3 % (ref 37–48.5)
HGB BLD-MCNC: 13.1 G/DL (ref 12–16)
IMM GRANULOCYTES # BLD AUTO: 0.05 K/UL (ref 0–0.04)
IMM GRANULOCYTES NFR BLD AUTO: 0.4 % (ref 0–0.5)
LYMPHOCYTES # BLD AUTO: 2.8 K/UL (ref 1–4.8)
LYMPHOCYTES NFR BLD: 21.4 % (ref 18–48)
MCH RBC QN AUTO: 28.2 PG (ref 27–31)
MCHC RBC AUTO-ENTMCNC: 31.7 G/DL (ref 32–36)
MCV RBC AUTO: 89 FL (ref 82–98)
MONOCYTES # BLD AUTO: 1.3 K/UL (ref 0.3–1)
MONOCYTES NFR BLD: 10.2 % (ref 4–15)
NEUTROPHILS # BLD AUTO: 8.4 K/UL (ref 1.8–7.7)
NEUTROPHILS NFR BLD: 65.4 % (ref 38–73)
NRBC BLD-RTO: 0 /100 WBC
PLATELET # BLD AUTO: 280 K/UL (ref 150–450)
PMV BLD AUTO: 12 FL (ref 9.2–12.9)
POTASSIUM SERPL-SCNC: 4.1 MMOL/L (ref 3.5–5.1)
RBC # BLD AUTO: 4.64 M/UL (ref 4–5.4)
SODIUM SERPL-SCNC: 140 MMOL/L (ref 136–145)
WBC # BLD AUTO: 12.89 K/UL (ref 3.9–12.7)

## 2024-03-15 PROCEDURE — 97161 PT EVAL LOW COMPLEX 20 MIN: CPT

## 2024-03-15 PROCEDURE — 94799 UNLISTED PULMONARY SVC/PX: CPT | Mod: XB

## 2024-03-15 PROCEDURE — 94761 N-INVAS EAR/PLS OXIMETRY MLT: CPT

## 2024-03-15 PROCEDURE — 85025 COMPLETE CBC W/AUTO DIFF WBC: CPT | Performed by: STUDENT IN AN ORGANIZED HEALTH CARE EDUCATION/TRAINING PROGRAM

## 2024-03-15 PROCEDURE — 25000242 PHARM REV CODE 250 ALT 637 W/ HCPCS: Performed by: STUDENT IN AN ORGANIZED HEALTH CARE EDUCATION/TRAINING PROGRAM

## 2024-03-15 PROCEDURE — 99900035 HC TECH TIME PER 15 MIN (STAT)

## 2024-03-15 PROCEDURE — 25000003 PHARM REV CODE 250: Performed by: STUDENT IN AN ORGANIZED HEALTH CARE EDUCATION/TRAINING PROGRAM

## 2024-03-15 PROCEDURE — 94640 AIRWAY INHALATION TREATMENT: CPT

## 2024-03-15 PROCEDURE — 21400001 HC TELEMETRY ROOM

## 2024-03-15 PROCEDURE — 80048 BASIC METABOLIC PNL TOTAL CA: CPT | Performed by: STUDENT IN AN ORGANIZED HEALTH CARE EDUCATION/TRAINING PROGRAM

## 2024-03-15 PROCEDURE — 99900031 HC PATIENT EDUCATION (STAT)

## 2024-03-15 PROCEDURE — 97535 SELF CARE MNGMENT TRAINING: CPT

## 2024-03-15 PROCEDURE — 63600175 PHARM REV CODE 636 W HCPCS: Performed by: STUDENT IN AN ORGANIZED HEALTH CARE EDUCATION/TRAINING PROGRAM

## 2024-03-15 PROCEDURE — 27000221 HC OXYGEN, UP TO 24 HOURS

## 2024-03-15 PROCEDURE — 97166 OT EVAL MOD COMPLEX 45 MIN: CPT

## 2024-03-15 PROCEDURE — 36415 COLL VENOUS BLD VENIPUNCTURE: CPT | Performed by: STUDENT IN AN ORGANIZED HEALTH CARE EDUCATION/TRAINING PROGRAM

## 2024-03-15 RX ADMIN — METOPROLOL TARTRATE 50 MG: 50 TABLET, FILM COATED ORAL at 08:03

## 2024-03-15 RX ADMIN — CITALOPRAM HYDROBROMIDE 20 MG: 20 TABLET ORAL at 08:03

## 2024-03-15 RX ADMIN — FUROSEMIDE 40 MG: 40 TABLET ORAL at 08:03

## 2024-03-15 RX ADMIN — IPRATROPIUM BROMIDE AND ALBUTEROL SULFATE 3 ML: 2.5; .5 SOLUTION RESPIRATORY (INHALATION) at 01:03

## 2024-03-15 RX ADMIN — Medication 6 MG: at 08:03

## 2024-03-15 RX ADMIN — CEFTRIAXONE 2 G: 2 INJECTION, POWDER, FOR SOLUTION INTRAMUSCULAR; INTRAVENOUS at 04:03

## 2024-03-15 RX ADMIN — IPRATROPIUM BROMIDE AND ALBUTEROL SULFATE 3 ML: 2.5; .5 SOLUTION RESPIRATORY (INHALATION) at 06:03

## 2024-03-15 RX ADMIN — AZITHROMYCIN MONOHYDRATE 500 MG: 500 INJECTION, POWDER, LYOPHILIZED, FOR SOLUTION INTRAVENOUS at 10:03

## 2024-03-15 RX ADMIN — OXYBUTYNIN CHLORIDE 10 MG: 5 TABLET, EXTENDED RELEASE ORAL at 08:03

## 2024-03-15 RX ADMIN — IPRATROPIUM BROMIDE AND ALBUTEROL SULFATE 3 ML: 2.5; .5 SOLUTION RESPIRATORY (INHALATION) at 12:03

## 2024-03-15 RX ADMIN — PANTOPRAZOLE SODIUM 40 MG: 40 TABLET, DELAYED RELEASE ORAL at 08:03

## 2024-03-15 RX ADMIN — IPRATROPIUM BROMIDE AND ALBUTEROL SULFATE 3 ML: 2.5; .5 SOLUTION RESPIRATORY (INHALATION) at 07:03

## 2024-03-15 RX ADMIN — APIXABAN 2.5 MG: 2.5 TABLET, FILM COATED ORAL at 08:03

## 2024-03-15 NOTE — NURSING
[facility] [place]  Nurses Note -- 4 Eyes      3/14/2024       Skin assessed on: Q Shift      [x] No Pressure Injuries Present    [x]Prevention Measures Documented    [] Yes LDA  for Pressure Injury Previously documented     [] Yes New Pressure Injury Discovered   [] LDA for New Pressure Injury Added      Attending RN:  Mya Garcia RN     Second RN:  Oneida MACIAS RN

## 2024-03-15 NOTE — PLAN OF CARE
Problem: Physical Therapy  Goal: Physical Therapy Goal  Description: Goals to be met by: 3/29/24     Patient will increase functional independence with mobility by performin. Supine to sit with Modified [place]  2. Rolling to Left and Right with Modified [place]  3. Sit to stand transfer with Modified [place] using RW  4. Bed to chair transfer with Modified [place] using Rolling Walker  5. Gait x250 feet with Modified [place] using Rolling Walker   6. Lower extremity exercise program 2 sets x15 reps per handout, with independence    Outcome: Ongoing, Progressing     Pt ambulated ~80 ft with CGA using RW, spO2 on RA 92-95% and HR ~88 bpm.

## 2024-03-15 NOTE — PLAN OF CARE
Problem: Adult Inpatient Plan of Care  Goal: Plan of Care Review  Outcome: Ongoing, Progressing     Problem: Adjustment to Illness (Sepsis/Septic Shock)  Goal: Optimal Coping  Outcome: Ongoing, Progressing     Problem: Bleeding (Sepsis/Septic Shock)  Goal: Absence of Bleeding  Outcome: Ongoing, Progressing     Problem: Glycemic Control Impaired (Sepsis/Septic Shock)  Goal: Blood Glucose Level Within Desired Range  Outcome: Ongoing, Progressing     Problem: Infection Progression (Sepsis/Septic Shock)  Goal: Absence of Infection Signs and Symptoms  Outcome: Ongoing, Progressing     Problem: Nutrition Impaired (Sepsis/Septic Shock)  Goal: Optimal Nutrition Intake  Outcome: Ongoing, Progressing     Problem: Fluid Imbalance (Pneumonia)  Goal: Fluid Balance  Outcome: Ongoing, Progressing     Problem: Infection (Pneumonia)  Goal: Resolution of Infection Signs and Symptoms  Outcome: Ongoing, Progressing     Problem: Respiratory Compromise (Pneumonia)  Goal: Effective Oxygenation and Ventilation  Outcome: Ongoing, Progressing     Problem: Skin Injury Risk Increased  Goal: Skin Health and Integrity  Outcome: Ongoing, Progressing

## 2024-03-15 NOTE — PROGRESS NOTES
[facility] Medicine  Progress Note    Patient Name: Garima Fraser  MRN: [id number]  Patient Class: IP- Inpatient   Admission Date: 3/11/2024  Length of Stay: 4 days  Attending Physician: Saurabh Rosa MD  Primary Care Provider: Sherie Torres MD        Subjective:     Principal Problem:Community acquired pneumonia        HPI:  This is an 88-year-old female with a past medical history of asthma, AFib (on Eliquis), hypertension, HFpEF (EF: 55%) who presents with shortness of breath.     Patient presented to [facility] ED with shortness of breath. She reports sudden onset dyspnea that started on the night of presentation. She woke up from sleep and experienced respiratory distress which made her anxious. She also has a productive cough with green sputum that started several weeks prior, and was prescribed a Z-pack for on 2/22. Associated symptoms include generalized weakness. No urinary symptoms.     In the ED, the patient was tachypneic, tachycardic (120s) and was placed on 4 L O2.  Labs were remarkable for leukocytosis (23.6), elevated lactic acid (2.5 > 6.6).  UA showed positive nitrites, 5 RBCs, 20 WBCs and few bacteria.  CTA chest showed no PE, with minimal linear atelectasis at the anterior left lung base within the lingular segment left upper lobe and mild mosaic perfusion pattern throughout the lungs which could be secondary to chronic pulmonary vascular or airways process. .  Patient was given 500 mL of NS, ceftriaxone 1 g IV, DuoNeb x2, hydralazine 10 mg IV.  Family requested transfer to [facility] as it is closer to home.  She was admitted for further management.    Overview/Hospital Course:  Admitted with sepsis and hypoxemic respiratory failure secondary to asthma exacerbation and concerns for bacterial infection. Started on antibiotics, steroids and supplemental oxygen.    Interval History: feeling well today, seems to be in good spirits    Review of  Systems  Objective:     Vital Signs (Most Recent):  Temp: 97.8 °F (36.6 °C) (03/15/24 1131)  Pulse: 76 (03/15/24 1154)  Resp: 19 (03/15/24 1131)  BP: (!) 141/72 (03/15/24 1131)  SpO2: 96 % (03/15/24 1131) Vital Signs (24h Range):  Temp:  [97.8 °F (36.6 °C)-98.8 °F (37.1 °C)] 97.8 °F (36.6 °C)  Pulse:  [] 76  Resp:  [18-24] 19  SpO2:  [93 %-97 %] 96 %  BP: (131-171)/(61-89) 141/72     Weight: 80.6 kg (177 lb 11.1 oz)  Body mass index is 35.89 kg/m².    Intake/Output Summary (Last 24 hours) at 3/15/2024 1156  Last data filed at 3/15/2024 0900  Gross per 24 hour   Intake 120 ml   Output 2100 ml   Net -1980 ml           Physical Exam  Vitals and nursing note reviewed.   Constitutional:       Appearance: She is ill-appearing.   Cardiovascular:      Rate and Rhythm: Normal rate and regular rhythm.      Pulses: Normal pulses.   Pulmonary:      Effort: Pulmonary effort is normal.      Breath sounds: No wheezing.      Comments: Lungs clear to auscultation and able to take deep breaths   Abdominal:      General: Bowel sounds are normal. There is no distension.   Skin:     General: Skin is warm.   Neurological:      Mental Status: She is alert and oriented to person, place, and time. Mental status is at baseline.   Psychiatric:         Mood and Affect: Mood normal.         Thought Content: Thought content normal.             Significant Labs: All pertinent labs within the past 24 hours have been reviewed.    Significant Imaging: I have reviewed all pertinent imaging results/findings within the past 24 hours.    Assessment/Plan:      * Community acquired pneumonia  - Patient presents with shortness of breath, cough   - Labs are remarkable for leukocytosis and lactic acidosis   - CTA chest showed no PE, with minimal linear atelectasis at the anterior left lung base within the lingular segment left upper lobe and mild mosaic perfusion pattern throughout the lungs which could be secondary to chronic pulmonary vascular or  "airways process.   - Continue ceftriaxone/azithromycin   - Respiratory cultures - sent on 3/13 with normal sincere  - Symptomatic control       Hypoxia  - requiring oxygen with underlying asthma and now with pneumonia  - wean as tolerated  - may need oxygen at discharge  - PT/OT consulted      Severe sepsis  This patient does have evidence of infective focus  My overall impression is sepsis.  Source: Respiratory  Antibiotics given-   Antibiotics (72h ago, onward)      Start     Stop Route Frequency Ordered    03/12/24 1600  cefTRIAXone (ROCEPHIN) 2 g in dextrose 5 % in water (D5W) 100 mL IVPB (MB+)  (Order Panel)         -- IV Every 24 hours (non-standard times) 03/11/24 2124 03/11/24 2230  azithromycin (ZITHROMAX) 500 mg in dextrose 5 % (D5W) 250 mL IVPB (Vial-Mate)  (Order Panel)         -- IV Every 24 hours (non-standard times) 03/11/24 2124          Latest lactate reviewed-  No results for input(s): "LACTATE", "POCLAC" in the last 72 hours.    Organ dysfunction indicated by  N/A    Fluid challenge Not needed - patient is not hypotensive      Post- resuscitation assessment No - Post resuscitation assessment not needed     LA is likely related to an infectious process. Other differentials: could be secondary to work of breath/Duo-Nebs.     Will Not start Pressors- Levophed for MAP of 65  Source control achieved by: Abx    Sepsis is improved and physiology resolved    Anxiety  Resume home Celexa, Xanax       Chronic heart failure with preserved ejection fraction  NT-proBNP was negative.   Doubt exacerbation    Asthma  Doubt exacerbation.   Duo-Nebs       Atrial fibrillation  [id number] Score: 3. Continue Eliquis, metoprolol.       VTE Risk Mitigation (From admission, onward)           Ordered     apixaban tablet 2.5 mg  2 times daily         03/11/24 2124     IP VTE HIGH RISK PATIENT  Once         03/11/24 2028     Place sequential compression device  Until discontinued         03/11/24 2028              "       Discharge Planning   CORBY:      Code Status: Full Code   Is the patient medically ready for discharge?:     Reason for patient still in hospital (select all that apply): Treatment  Discharge Plan A: Home with family                  Saurabh Rosa MD  Department of [facility] Medicine   [facility]

## 2024-03-15 NOTE — SUBJECTIVE & OBJECTIVE
Interval History: feeling well today, seems to be in good spirits    Review of Systems  Objective:     Vital Signs (Most Recent):  Temp: 97.8 °F (36.6 °C) (03/15/24 1131)  Pulse: 76 (03/15/24 1154)  Resp: 19 (03/15/24 1131)  BP: (!) 141/72 (03/15/24 1131)  SpO2: 96 % (03/15/24 1131) Vital Signs (24h Range):  Temp:  [97.8 °F (36.6 °C)-98.8 °F (37.1 °C)] 97.8 °F (36.6 °C)  Pulse:  [] 76  Resp:  [18-24] 19  SpO2:  [93 %-97 %] 96 %  BP: (131-171)/(61-89) 141/72     Weight: 80.6 kg (177 lb 11.1 oz)  Body mass index is 35.89 kg/m².    Intake/Output Summary (Last 24 hours) at 3/15/2024 1156  Last data filed at 3/15/2024 0900  Gross per 24 hour   Intake 120 ml   Output 2100 ml   Net -1980 ml           Physical Exam  Vitals and nursing note reviewed.   Constitutional:       Appearance: She is ill-appearing.   Cardiovascular:      Rate and Rhythm: Normal rate and regular rhythm.      Pulses: Normal pulses.   Pulmonary:      Effort: Pulmonary effort is normal.      Breath sounds: No wheezing.      Comments: Lungs clear to auscultation and able to take deep breaths   Abdominal:      General: Bowel sounds are normal. There is no distension.   Skin:     General: Skin is warm.   Neurological:      Mental Status: She is alert and oriented to person, place, and time. Mental status is at baseline.   Psychiatric:         Mood and Affect: Mood normal.         Thought Content: Thought content normal.             Significant Labs: All pertinent labs within the past 24 hours have been reviewed.    Significant Imaging: I have reviewed all pertinent imaging results/findings within the past 24 hours.

## 2024-03-15 NOTE — PT/OT/SLP PROGRESS
Physical Therapy      Patient Name:  Garima Fraser   MRN:  [id number]    Patient not seen for PT eval at this time secondary to (Pt working with OT at this time.). Will follow-up.

## 2024-03-15 NOTE — ASSESSMENT & PLAN NOTE
"This patient does have evidence of infective focus  My overall impression is sepsis.  Source: Respiratory  Antibiotics given-   Antibiotics (72h ago, onward)      Start     Stop Route Frequency Ordered    03/12/24 1600  cefTRIAXone (ROCEPHIN) 2 g in dextrose 5 % in water (D5W) 100 mL IVPB (MB+)  (Order Panel)         -- IV Every 24 hours (non-standard times) 03/11/24 2124 03/11/24 2230  azithromycin (ZITHROMAX) 500 mg in dextrose 5 % (D5W) 250 mL IVPB (Vial-Mate)  (Order Panel)         -- IV Every 24 hours (non-standard times) 03/11/24 2124          Latest lactate reviewed-  No results for input(s): "LACTATE", "POCLAC" in the last 72 hours.    Organ dysfunction indicated by  N/A    Fluid challenge Not needed - patient is not hypotensive      Post- resuscitation assessment No - Post resuscitation assessment not needed     LA is likely related to an infectious process. Other differentials: could be secondary to work of breath/Duo-Nebs.     Will Not start Pressors- Levophed for MAP of 65  Source control achieved by: Abx    Sepsis is improved and physiology resolved  "

## 2024-03-15 NOTE — PLAN OF CARE
Problem: Infection Progression (Sepsis/Septic Shock)  Goal: Absence of Infection Signs and Symptoms  Outcome: Met  Intervention: Initiate Sepsis Management  Flowsheets (Taken 3/15/2024 1614)  Infection Prevention:   environmental surveillance performed   equipment surfaces disinfected   hand hygiene promoted  Stabilization Measures:   legs elevated   provider notified  Isolation Precautions: precautions maintained     Problem: Infection (Pneumonia)  Goal: Resolution of Infection Signs and Symptoms  Outcome: Met  Intervention: Prevent Infection Progression  Flowsheets (Taken 3/15/2024 1617)  Isolation Precautions: precautions maintained

## 2024-03-15 NOTE — PT/OT/SLP EVAL
Occupational Therapy Evaluation     Name: Garima Fraser  MRN: [id number]  Admitting Diagnosis: Community acquired pneumonia  Recent Surgery: * No surgery found *      Recommendations:     Discharge Recommendations: Low Intensity Therapy (she assists with daughter at home who has blood cancer per her report)  Level of Assistance Recommended: Intermittent assistance and Intermittent supervision  Discharge Equipment Recommendations: shower chair  Barriers to discharge: None    Assessment:     Garima Fraser is a 88 y.o. female with a medical diagnosis of Community acquired pneumonia. She presents with performance deficits affecting function including impaired endurance, weakness, impaired functional mobility, impaired self care skills, impaired balance, gait instability, decreased lower extremity function, decreased safety awareness, impaired cardiopulmonary response to activity. Patient with son and granddaughter present and on 2L NC oxygen with some SOB noted after sitting EOB. However, she achieved 96% walking to toilet and to chair with fatigue noted.   Rehab Prognosis: Good; patient would benefit from acute OT services to address these deficits and reach maximum level of function.    Plan:     Patient to be seen  (3-5x/week) to address the above listed problems via self-care/home management, therapeutic activities, therapeutic exercises  Plan of Care Expires: 03/23/24  Plan of Care Reviewed with: patient    Subjective     Chief Complaint: fatigue   Patient Comments/Goals: return home with daughter   Pain/Comfort:  Pain Rating 1: 0/10    Patients cultural, spiritual, Yazdanism conflicts given the current situation: no    Social History:  Living Environment: Patient lives with their daughter in a 1 story home with number of outside stair(s): 0   Prior Level of Function: Prior to admission, patient was modified independent  Roles and Routines: Patient was driving and retired  and   prior to admission; she cares for her daughter who has blood cancer per her report   Equipment Used at Home: cane, quad, walker, rolling  DME owned (not currently used): none  Assistance Upon Discharge: family    Objective:     Communicated with RNIsa, prior to session. Patient found HOB elevated with peripheral IV, telemetry, oxygen upon OT entry to room.    General Precautions: Standard, hearing impaired, fall, respiratory   Orthopedic Precautions: N/A   Braces: N/A    Respiratory Status: Nasal cannula, flow 2  L/min    Occupational Performance    Gait belt applied - Yes    Bed Mobility:   Rolling/Turning to Right with modified independence  Scooting anteriorly to EOB to have both feet planted on floor: modified independence  Supine to sit from right side of bed with modified independence    Functional Mobility/Transfers:  Sit <> Stand Transfer with supervision with rolling walker  Bed <> Chair Transfer using Step Transfer technique with supervision with rolling walker  Toilet Transfer Step Transfer technique with supervision with rolling walker  Functional Mobility: Patient walked from right side of bed to toilet in bathroom, to sink and then to chair x 20' in room.     Activities of Daily Living:  Grooming: set up assistance  Toileting: independence    Cognitive/Visual Perceptual:  Cognitive/Psychosocial Skills:    -     Oriented to: Person, Place, Time, Situation  -     Follows Commands/attention: Follows multistep  commands  -     Communication: clear/fluent  -     Memory: No Deficits noted  -     Safety awareness/insight to disability: intact  -     Mood/Affect/Coping skills/emotional control: Appropriate to situation  Visual/Perceptual:    -     Intact    Physical Exam:  Balance:    -     Sitting: modified independence  -     Standing: supervision  Postural examination/scapula alignment:    -       Rounded shoulders  -       Forward head  Skin integrity: Visible skin intact  Edema:  None  noted  Sensation:    -       Intact  Motor Planning: Intact  Dominant hand: Right  Upper Extremity Range of Motion:     -       Right Upper Extremity: WNL  -       Left Upper Extremity: WNL  Upper Extremity Strength:    -       Right Upper Extremity: WNL  -       Left Upper Extremity: WNL   Strength:    -       Right Upper Extremity: WNL  -       Left Upper Extremity: WNL  Fine Motor Coordination:    -       Intact  Gross motor coordination:   WFL    AMPAC 6 Click ADL:  AMPAC Total Score: 20    Treatment & Education:  Patient educated on role of OT, POC, and goals for therapy  Patient educated on importance of OOB activities with staff member assistance and sitting OOB majority of the day  Occupational therapy educated re: need for rest and energy conservation due to oxygen use.   Occupational therapy recommending shower chair for use at home.     Patient clear to ambulate to/from bathroom with RN/PCT, assist x1  .    Patient left up in chair with all lines intact, call button in reach, and RN notified.    GOALS:   Multidisciplinary Problems       Occupational Therapy Goals          Problem: Occupational Therapy    Goal Priority Disciplines Outcome Interventions   Occupational Therapy Goal     OT, PT/OT Ongoing, Progressing    Description: Goals to be met by: 03/22/24     Patient will increase functional independence with ADLs by performing:    UE Dressing with [place].  LE Dressing with [place].  Grooming while standing at sink with Modified [place].  Toileting from toilet with Modified [place] for hygiene and clothing management.   Sitting  in chair  x60  minutes with Modified [place].  Toilet transfer to toilet with Modified [place].  Upper extremity exercise program x10  reps per handout, with independence.                         History:     Past Medical History:   Diagnosis Date    After-cataract, obscuring vision 01/14/2015    Asthma     Atrial fibrillation     GERD  (gastroesophageal reflux disease)     Hearing loss     Osteoporosis     Shingles     Spinal stenosis          Past Surgical History:   Procedure Laterality Date    ABDOMINAL SURGERY      CATARACT EXTRACTION Right 06/29/2010    CATARACT EXTRACTION Left 06/15/2010    CHOLECYSTECTOMY      CHOLECYSTECTOMY      HYSTERECTOMY         Time Tracking:     OT Date of Treatment: 03/15/24  OT Start Time: 1031  OT Stop Time: 1100  OT Total Time (min): 29 min    Billable Minutes: Evaluation 15  and Self Care/Home Management 14     3/15/2024

## 2024-03-15 NOTE — NURSING
Note that patient awake, alert, [place], and requesting assistance with up out of bed to BSC.  Denies pain, on 2L humidified O2 and denies home O2 use.   Patient urinated 200 cc yellow urine. Able to maneuver with one-person assistance.     Will continue to f/u.

## 2024-03-15 NOTE — PT/OT/SLP EVAL
Physical Therapy Evaluation    Patient Name:  Garima Fraser   MRN:  [id number]    Recommendations:     Discharge Recommendations: Low Intensity Therapy (with caregiver support)   Discharge Equipment Recommendations: bath bench   Barriers to discharge: None    Assessment:     Garima Fraser is a 88 y.o. female admitted with a medical diagnosis of Community acquired pneumonia.  She presents with the following impairments/functional limitations: weakness, impaired endurance, impaired self care skills, impaired functional mobility, gait instability, impaired balance, decreased upper extremity function, decreased lower extremity function, decreased safety awareness, decreased ROM, impaired cardiopulmonary response to activity.    Rehab Prognosis: Good; patient would benefit from acute skilled PT services to address these deficits and reach maximum level of function.    Recent Surgery: * No surgery found *      Plan:     During this hospitalization, patient to be seen 6 x/week to address the identified rehab impairments via gait training, therapeutic activities, therapeutic exercises and progress toward the following goals:    Plan of Care Expires:  03/29/24    Subjective     Chief Complaint: weakness  Patient/Family Comments/goals: Pt agreeable to therapy with encouragement.   Pain/Comfort:  Pain Rating 1: 0/10      Living Environment:  Pt lives with dtr in a [facility] with no concerns at entry.  Pt was providing some assistance to dtr who has CA.   Prior to admission, patients level of function was mod I with ambulation using RW.  Pt was driving PTA.  Equipment at home: cane, quad, walker, rolling.  Upon discharge, patient will have assistance from dtr and grand-dtr.    Objective:     Patient found up in chair on pillow with peripheral IV, telemetry, oxygen ([employer] monitor)  upon PT entry to room.    General Precautions: Standard, fall, respiratory, [place]  Orthopedic Precautions:N/A   Braces: N/A  Respiratory Status:  Nasal cannula, flow 2 L/min    Exams:  Cognitive Exam:  Patient was able to follow 2 step commands.   Gross Motor Coordination:  WFL  Postural Exam:  Patient presented with the following abnormalities:    -       Rounded shoulders  -       Forward head  -       [place]  Sensation:    -       Intact  light/touch BLE  Skin Integrity/Edema:      -       Skin integrity: Visible skin intact  -       Edema: None noted BLE  BLE ROM: WFL  BLE Strength: WFL    Functional Mobility:  Transfers:     Sit to Stand: contact guard assistance with rolling walker  Bed to Chair: contact guard assistance with  rolling walker  using  Step Transfer  Gait: Pt ambulated ~80 ft with CGA using RW.  spO2 on RA ~92-95% and HR ~88 bpm.  Pt with decreased step length and shannan.  Pt with decreased endurance, declined further gait training.   Balance: Pt with fair dynamic standing balance.       AM-PAC 6 CLICK MOBILITY  Total Score:17       Treatment & Education:  BLE seated therex x10 reps: LAQ.  Pt feeling tired with OT, then PT back to back.  Pt declined further therex and gait training.     Pt educated on acute skilled PT services and goals.  Pt to call for nursing assistance with OOB activities while in the hospital.  Pt verbalized good understanding.     Patient left up in chair on pillow with all lines intact, call button in reach, and nurse Isa notified.  Pt placed back on 2L O2 NC.  Tray table close by.     GOALS:   Multidisciplinary Problems       Physical Therapy Goals          Problem: Physical Therapy    Goal Priority Disciplines Outcome Goal Variances Interventions   Physical Therapy Goal     PT, PT/OT Ongoing, Progressing     Description: Goals to be met by: 3/29/24     Patient will increase functional independence with mobility by performin. Supine to sit with Modified [place]  2. Rolling to Left and Right with Modified [place]  3. Sit to stand transfer with Modified [place] using RW  4. Bed to chair  transfer with Modified [place] using Rolling Walker  5. Gait x250 feet with Modified [place] using Rolling Walker   6. Lower extremity exercise program 2 sets x15 reps per handout, with independence                         History:     Past Medical History:   Diagnosis Date    After-cataract, obscuring vision 01/14/2015    Asthma     Atrial fibrillation     GERD (gastroesophageal reflux disease)     Hearing loss     Osteoporosis     Shingles     Spinal stenosis        Past Surgical History:   Procedure Laterality Date    ABDOMINAL SURGERY      CATARACT EXTRACTION Right 06/29/2010    CATARACT EXTRACTION Left 06/15/2010    CHOLECYSTECTOMY      CHOLECYSTECTOMY      HYSTERECTOMY         Time Tracking:     PT Received On: 03/15/24  PT Start Time: 1119     PT Stop Time: 1135  PT Total Time (min): 16 min     Billable Minutes: Evaluation 16 min      03/15/2024

## 2024-03-15 NOTE — PLAN OF CARE
Problem: Occupational Therapy  Goal: Occupational Therapy Goal  Description: Goals to be met by: 03/22/24     Patient will increase functional independence with ADLs by performing:    UE Dressing with [place].  LE Dressing with [place].  Grooming while standing at sink with Modified [place].  Toileting from toilet with Modified [place] for hygiene and clothing management.   Sitting  in chair  x60  minutes with Modified [place].  Toilet transfer to toilet with Modified [place].  Upper extremity exercise program x10  reps per handout, with independence.    Outcome: Ongoing, Progressing   Patient recommended for low intensive therapy and shower chair. Occupational therapy to see 3-5x/week.

## 2024-03-16 VITALS
TEMPERATURE: 98 F | WEIGHT: 177.5 LBS | DIASTOLIC BLOOD PRESSURE: 55 MMHG | HEART RATE: 90 BPM | OXYGEN SATURATION: 92 % | BODY MASS INDEX: 35.78 KG/M2 | HEIGHT: 59 IN | SYSTOLIC BLOOD PRESSURE: 110 MMHG | RESPIRATION RATE: 12 BRPM

## 2024-03-16 LAB
ANION GAP SERPL CALC-SCNC: 9 MMOL/L (ref 8–16)
BASOPHILS # BLD AUTO: 0.08 K/UL (ref 0–0.2)
BASOPHILS NFR BLD: 0.6 % (ref 0–1.9)
BUN SERPL-MCNC: 12 MG/DL (ref 8–23)
CALCIUM SERPL-MCNC: 9.4 MG/DL (ref 8.7–10.5)
CHLORIDE SERPL-SCNC: 105 MMOL/L (ref 95–110)
CO2 SERPL-SCNC: 26 MMOL/L (ref 23–29)
CREAT SERPL-MCNC: 0.8 MG/DL (ref 0.5–1.4)
DIFFERENTIAL METHOD BLD: ABNORMAL
EOSINOPHIL # BLD AUTO: 0.2 K/UL (ref 0–0.5)
EOSINOPHIL NFR BLD: 1.7 % (ref 0–8)
ERYTHROCYTE [DISTWIDTH] IN BLOOD BY AUTOMATED COUNT: 12.7 % (ref 11.5–14.5)
EST. GFR  (NO RACE VARIABLE): >60 ML/MIN/1.73 M^2
GLUCOSE SERPL-MCNC: 105 MG/DL (ref 70–110)
HCT VFR BLD AUTO: 41.7 % (ref 37–48.5)
HGB BLD-MCNC: 13.4 G/DL (ref 12–16)
IMM GRANULOCYTES # BLD AUTO: 0.07 K/UL (ref 0–0.04)
IMM GRANULOCYTES NFR BLD AUTO: 0.5 % (ref 0–0.5)
LYMPHOCYTES # BLD AUTO: 2 K/UL (ref 1–4.8)
LYMPHOCYTES NFR BLD: 14.9 % (ref 18–48)
MCH RBC QN AUTO: 28 PG (ref 27–31)
MCHC RBC AUTO-ENTMCNC: 32.1 G/DL (ref 32–36)
MCV RBC AUTO: 87 FL (ref 82–98)
MONOCYTES # BLD AUTO: 1.3 K/UL (ref 0.3–1)
MONOCYTES NFR BLD: 9.4 % (ref 4–15)
NEUTROPHILS # BLD AUTO: 9.7 K/UL (ref 1.8–7.7)
NEUTROPHILS NFR BLD: 72.9 % (ref 38–73)
NRBC BLD-RTO: 0 /100 WBC
PLATELET # BLD AUTO: 308 K/UL (ref 150–450)
PMV BLD AUTO: 11.6 FL (ref 9.2–12.9)
POTASSIUM SERPL-SCNC: 3.7 MMOL/L (ref 3.5–5.1)
RBC # BLD AUTO: 4.78 M/UL (ref 4–5.4)
SODIUM SERPL-SCNC: 140 MMOL/L (ref 136–145)
WBC # BLD AUTO: 13.29 K/UL (ref 3.9–12.7)

## 2024-03-16 PROCEDURE — 94761 N-INVAS EAR/PLS OXIMETRY MLT: CPT

## 2024-03-16 PROCEDURE — 85025 COMPLETE CBC W/AUTO DIFF WBC: CPT | Performed by: STUDENT IN AN ORGANIZED HEALTH CARE EDUCATION/TRAINING PROGRAM

## 2024-03-16 PROCEDURE — 94640 AIRWAY INHALATION TREATMENT: CPT

## 2024-03-16 PROCEDURE — 36415 COLL VENOUS BLD VENIPUNCTURE: CPT | Performed by: STUDENT IN AN ORGANIZED HEALTH CARE EDUCATION/TRAINING PROGRAM

## 2024-03-16 PROCEDURE — 25000003 PHARM REV CODE 250: Performed by: STUDENT IN AN ORGANIZED HEALTH CARE EDUCATION/TRAINING PROGRAM

## 2024-03-16 PROCEDURE — 97116 GAIT TRAINING THERAPY: CPT | Mod: CQ

## 2024-03-16 PROCEDURE — 25000242 PHARM REV CODE 250 ALT 637 W/ HCPCS: Performed by: STUDENT IN AN ORGANIZED HEALTH CARE EDUCATION/TRAINING PROGRAM

## 2024-03-16 PROCEDURE — 99900035 HC TECH TIME PER 15 MIN (STAT)

## 2024-03-16 PROCEDURE — 27000221 HC OXYGEN, UP TO 24 HOURS

## 2024-03-16 PROCEDURE — 80048 BASIC METABOLIC PNL TOTAL CA: CPT | Performed by: STUDENT IN AN ORGANIZED HEALTH CARE EDUCATION/TRAINING PROGRAM

## 2024-03-16 PROCEDURE — 94799 UNLISTED PULMONARY SVC/PX: CPT | Mod: XB

## 2024-03-16 RX ORDER — ALBUTEROL SULFATE 1.25 MG/3ML
1.25 SOLUTION RESPIRATORY (INHALATION) EVERY 6 HOURS PRN
Qty: 75 ML | Refills: 4 | Status: SHIPPED | OUTPATIENT
Start: 2024-03-16 | End: 2025-03-16

## 2024-03-16 RX ORDER — AMOXICILLIN AND CLAVULANATE POTASSIUM 875; 125 MG/1; MG/1
1 TABLET, FILM COATED ORAL 2 TIMES DAILY
Qty: 4 TABLET | Refills: 0 | Status: SHIPPED | OUTPATIENT
Start: 2024-03-16

## 2024-03-16 RX ORDER — FUROSEMIDE 20 MG/1
20 TABLET ORAL DAILY
Qty: 30 TABLET | Refills: 11 | Status: SHIPPED | OUTPATIENT
Start: 2024-03-17 | End: 2025-03-17

## 2024-03-16 RX ADMIN — IPRATROPIUM BROMIDE AND ALBUTEROL SULFATE 3 ML: 2.5; .5 SOLUTION RESPIRATORY (INHALATION) at 12:03

## 2024-03-16 RX ADMIN — FUROSEMIDE 40 MG: 40 TABLET ORAL at 08:03

## 2024-03-16 RX ADMIN — APIXABAN 2.5 MG: 2.5 TABLET, FILM COATED ORAL at 08:03

## 2024-03-16 RX ADMIN — IPRATROPIUM BROMIDE AND ALBUTEROL SULFATE 3 ML: 2.5; .5 SOLUTION RESPIRATORY (INHALATION) at 01:03

## 2024-03-16 RX ADMIN — IPRATROPIUM BROMIDE AND ALBUTEROL SULFATE 3 ML: 2.5; .5 SOLUTION RESPIRATORY (INHALATION) at 07:03

## 2024-03-16 RX ADMIN — OXYBUTYNIN CHLORIDE 10 MG: 5 TABLET, EXTENDED RELEASE ORAL at 08:03

## 2024-03-16 RX ADMIN — PANTOPRAZOLE SODIUM 40 MG: 40 TABLET, DELAYED RELEASE ORAL at 08:03

## 2024-03-16 RX ADMIN — METOPROLOL TARTRATE 50 MG: 50 TABLET, FILM COATED ORAL at 08:03

## 2024-03-16 RX ADMIN — CITALOPRAM HYDROBROMIDE 20 MG: 20 TABLET ORAL at 08:03

## 2024-03-16 NOTE — PLAN OF CARE
Problem: Adult Inpatient Plan of Care  Goal: Plan of Care Review  Outcome: Ongoing, Progressing  Goal: Patient-Specific Goal (Individualized)  Outcome: Ongoing, Progressing  Goal: Absence of Hospital-Acquired Illness or Injury  Outcome: Ongoing, Progressing  Goal: Optimal Comfort and Wellbeing  Outcome: Ongoing, Progressing  Goal: Readiness for Transition of Care  Outcome: Ongoing, Progressing     Problem: Adjustment to Illness (Sepsis/Septic Shock)  Goal: Optimal Coping  Outcome: Ongoing, Progressing     Problem: Bleeding (Sepsis/Septic Shock)  Goal: Absence of Bleeding  Outcome: Ongoing, Progressing     Problem: Glycemic Control Impaired (Sepsis/Septic Shock)  Goal: Blood Glucose Level Within Desired Range  Outcome: Ongoing, Progressing     Problem: Nutrition Impaired (Sepsis/Septic Shock)  Goal: Optimal Nutrition Intake  Outcome: Ongoing, Progressing     Problem: Fluid Imbalance (Pneumonia)  Goal: Fluid Balance  Outcome: Ongoing, Progressing     Problem: Respiratory Compromise (Pneumonia)  Goal: Effective Oxygenation and Ventilation  Outcome: Ongoing, Progressing     Problem: Skin Injury Risk Increased  Goal: Skin Health and Integrity  Outcome: Ongoing, Progressing

## 2024-03-16 NOTE — NURSING
Testing for home oxygen:  SPO2@92% on RA at rest. HR:80. Denies SOB.  SPO2@93% on RA  with exercise. Denies SOB.    Will continue to f/u.

## 2024-03-16 NOTE — PLAN OF CARE
03/16/24 1231   Medicare Message   Important Message from Medicare regarding Discharge Appeal Rights Explained to patient/caregiver  (REZA explained IMM. Pt's daughterSusu verbally expressed understanding. REZA emailed a copy to [email or web address].)   Date IMM was signed 03/16/24   Time IMM was signed 1225     Pt's daughterSusu agreed with discharge.

## 2024-03-16 NOTE — PLAN OF CARE
[facility]      HOME HEALTH ORDERS  FACE TO FACE ENCOUNTER    Patient Name: Garima Fraser  YOB: 1935    PCP: Sherie Torres MD   PCP Address: [address] [place] [address]  PCP Phone Number: [phone]  PCP Fax: [phone]    Encounter Date: 3/11/24    Admit to Home Health    Diagnoses:  Active Hospital Problems    Diagnosis  POA    *Community acquired pneumonia [J18.9]  Yes    Hypoxia [R09.02]  Yes    Asthma [J45.909]  Yes    Chronic heart failure with preserved ejection fraction [I50.32]  Yes    Anxiety [F41.9]  Yes    Severe sepsis [A41.9, R65.20]  Yes    Atrial fibrillation [I48.91]  Yes      Resolved Hospital Problems   No resolved problems to display.       Follow Up Appointments:  No future appointments.    Allergies:  Review of patient's allergies indicates:   Allergen Reactions    Adhesive Rash       Medications: Review discharge medications with patient and family and provide education.      I have seen and examined this patient within the last 30 days. My clinical findings that support the need for the home health skilled services and home bound status are the following:no   Weakness/numbness causing balance and gait disturbance due to Infection making it taxing to leave home.     Diet:   cardiac diet    Labs:  N/a    Referrals/ Consults  Physical Therapy to evaluate and treat. Evaluate for home safety and equipment needs; Establish/upgrade home exercise program. Perform / instruct on therapeutic exercises, gait training, transfer training, and Range of Motion.  Occupational Therapy to evaluate and treat. Evaluate home environment for safety and equipment needs. Perform/Instruct on transfers, ADL training, ROM, and therapeutic exercises.   to evaluate for community resources/long-range planning.  Aide to provide assistance with personal care, ADLs, and vital signs.    Activities:   activity as tolerated    Nursing:   Agency to admit  patient within 24 hours of hospital discharge unless specified on physician order or at patient request    SN to complete comprehensive assessment including routine vital signs. Instruct on disease process and s/s of complications to report to MD. Review/verify medication list sent home with the patient at time of discharge  and instruct patient/caregiver as needed. Frequency may be adjusted depending on start of care date.     Skilled nurse to perform up to 3 visits PRN for symptoms related to diagnosis    Notify MD if SBP > 160 or < 90; DBP > 90 or < 50; HR > 120 or < 50; Temp > 101; O2 < 88%; Other:       Ok to schedule additional visits based on staff availability and patient request on consecutive days within the home health episode.    Miscellaneous       Home Health Aide:  Nursing , Physical Therapy , Occupational Therapy , Medical Social Work , and Home Health Aide     Wound Care Orders  no    I certify that this patient is confined to her home and needs intermittent skilled nursing care, physical therapy, and occupational therapy.

## 2024-03-16 NOTE — PLAN OF CARE
03/16/24 1116   Post-Acute Status   Post-Acute Authorization Home Health   Home Health Status Pending post-acute provider review/more information requested   Coverage [facility]   Discharge Delays (!) Post-Acute Set-up   Discharge Plan   Discharge Plan A Home Health   Discharge Plan B Home with family

## 2024-03-16 NOTE — PT/OT/SLP PROGRESS
Physical Therapy Treatment    Patient Name:  Garima Fraser   MRN:  [id number]    Recommendations:     Discharge Recommendations: Low Intensity Therapy (she assists with daughter at home who has blood cancer per her report)  Discharge Equipment Recommendations: shower chair  Barriers to discharge: None    Assessment:     Garima Fraser is a 88 y.o. female admitted with a medical diagnosis of Community acquired pneumonia.  She presents with the following impairments/functional limitations: weakness, impaired endurance, decreased coordination, decreased lower extremity function, impaired functional mobility, gait instability, decreased safety awareness, impaired cardiopulmonary response to activity, impaired balance.    Rehab Prognosis: Good; patient would benefit from acute skilled PT services to address these deficits and reach maximum level of function.    Recent Surgery: * No surgery found *      Plan:     During this hospitalization, patient to be seen 6 x/week to address the identified rehab impairments via gait training, therapeutic activities, therapeutic exercises and progress toward the following goals:    Plan of Care Expires:  03/29/24    Subjective     Chief Complaint: Pt with no complaints or concerns at this time.   Patient/Family Comments/goals: Pt agreeable to PT treatment.   Pain/Comfort:  Pain Rating 1: 0/10      Objective:     Communicated with nursing prior to session.  Patient found HOB elevated with telemetry, peripheral IV upon PT entry to room.     General Precautions: Standard, hearing impaired, fall, respiratory  Orthopedic Precautions: N/A  Braces: N/A       Functional Mobility:  Bed Mobility:     Supine to Sit: contact guard assistance  Sit to Supine: contact guard assistance  Transfers:     Sit to Stand:  contact guard assistance with rolling walker  Gait: Pt ambulated 100+ with RW, CGA requiring verbal cueing on proper RW usage as well as proper heel strike to toe off gait  mechanics to improve foot clearance prevent LOB.   Balance: Pt with good sitting and fair standing balance.       AM-PAC 6 CLICK MOBILITY  Turning over in bed (including adjusting bedclothes, sheets and blankets)?: 3  Sitting down on and standing up from a chair with arms (e.g., wheelchair, bedside commode, etc.): 3  Moving from lying on back to sitting on the side of the bed?: 3  Moving to and from a bed to a chair (including a wheelchair)?: 3  Need to walk in hospital room?: 3  Climbing 3-5 steps with a railing?: 2  Basic Mobility Total Score: 17       Treatment & Education:      Patient left HOB elevated with all lines intact, call button in reach, and bed alarm on..    GOALS:   Multidisciplinary Problems       Physical Therapy Goals          Problem: Physical Therapy    Goal Priority Disciplines Outcome Goal Variances Interventions   Physical Therapy Goal     PT, PT/OT Ongoing, Progressing     Description: Goals to be met by: 3/29/24     Patient will increase functional independence with mobility by performin. Supine to sit with Modified [place]  2. Rolling to Left and Right with Modified [place]  3. Sit to stand transfer with Modified [place] using RW  4. Bed to chair transfer with Modified [place] using Rolling Walker  5. Gait x250 feet with Modified [place] using Rolling Walker   6. Lower extremity exercise program 2 sets x15 reps per handout, with independence                         Time Tracking:     PT Received On: 24  PT Start Time: 1050     PT Stop Time: 1100  PT Total Time (min): 10 min     Billable Minutes: Gait Training 10    Treatment Type: Treatment  PT/PTA: PTA     Number of PTA visits since last PT visit: 2024

## 2024-03-16 NOTE — NURSING
[facility]  Nurses Note -- 4 Eyes      3/15/2024       Skin assessed on: Q Shift      [x] No Pressure Injuries Present    [x]Prevention Measures Documented    [] Yes LDA  for Pressure Injury Previously documented     [] Yes New Pressure Injury Discovered   [] LDA for New Pressure Injury Added      Attending RN:  Maximus Gonzalze RN     Second RN:  TEDDY Moss

## 2024-03-16 NOTE — PLAN OF CARE
Problem: Adult Inpatient Plan of Care  Goal: Plan of Care Review  Outcome: Met  Goal: Patient-Specific Goal (Individualized)  Outcome: Met  Goal: Absence of Hospital-Acquired Illness or Injury  Outcome: Met  Goal: Optimal Comfort and Wellbeing  Outcome: Met  Goal: Readiness for Transition of Care  Outcome: Met     Problem: Adjustment to Illness (Sepsis/Septic Shock)  Goal: Optimal Coping  Outcome: Met     Problem: Bleeding (Sepsis/Septic Shock)  Goal: Absence of Bleeding  Outcome: Met     Problem: Glycemic Control Impaired (Sepsis/Septic Shock)  Goal: Blood Glucose Level Within Desired Range  Outcome: Met     Problem: Nutrition Impaired (Sepsis/Septic Shock)  Goal: Optimal Nutrition Intake  Outcome: Met     Problem: Fluid Imbalance (Pneumonia)  Goal: Fluid Balance  Outcome: Met     Problem: Respiratory Compromise (Pneumonia)  Goal: Effective Oxygenation and Ventilation  Outcome: Met     Problem: Skin Injury Risk Increased  Goal: Skin Health and Integrity  Outcome: Met

## 2024-03-16 NOTE — NURSING
[facility]  Nurses Note -- 4 Eyes      3/16/2024       Skin assessed on: Q Shift      [x] No Pressure Injuries Present    [x]Prevention Measures Documented    [] Yes LDA  for Pressure Injury Previously documented     [] Yes New Pressure Injury Discovered   [] LDA for New Pressure Injury Added      Attending RN:  Isa Cheek RN     Second RN:  TEDDY Stroud

## 2024-03-16 NOTE — PLAN OF CARE
Case Management Final Discharge Note    Discharge Disposition: [facility] New DME ordered / company name: [facility]- nebulizer pt's daughter, Susu declined shower chair.    Primary Caretaker and contact information: Susu- daughter    Transportation: Pt's family will provide transportation home when discharged.    Patient has declined to select a preferred provider and elects placement with the first accepting in network provider that is available to provide services as ordered by the physician.      Patient and family educated on discharge services and updated on DC plan. Bedside RN notified, patient clear to discharge from Case Management Perspective.       03/16/24 1324   Final Note   Assessment Type Final Discharge Note   Anticipated Discharge Disposition Home-Health   What phone number can be called within the next 1-3 days to see how you are doing after discharge? [phone]   Hospital Resources/Appts/Education Provided Appointments scheduled and added to AVS   Post-Acute Status   Post-Acute Authorization Home Health   Home Health Status Set-up Complete/Auth obtained   Coverage [facility]   Patient choice form signed by patient/caregiver List from System Post-Acute Care   Discharge Delays None known at this time

## 2024-03-16 NOTE — NURSING
Discharge Preparation:  Note that patient awake, alert, fully oriented and denies pain.  Patient dressed and reports that she is ready for discharge.   Case management coordination of service complete.     IV and telemetry monitor removed with no bleeding to IV insertion site.     Will contact Virtual RN for discharge teaching.    Contacted granddaughterFang ([phone]) for patient's transportation home.  She will come and get the patient.     AVS printed.

## 2024-03-17 NOTE — DISCHARGE SUMMARY
[facility] Medicine  Discharge Summary      Patient Name: Garima Fraser  MRN: [id number]  JULIETH: [id number]  Patient Class: IP- Inpatient  Admission Date: 3/11/2024  Hospital Length of Stay: 5 days  Discharge Date and Time: 3/16/2024  4:24 PM  Attending Physician: No att. providers found   Discharging Provider: Saurabh Rosa MD  Primary Care Provider: Sherie Torres MD    Primary Care Team: Networked reference to record PCT     HPI:   This is an 88-year-old female with a past medical history of asthma, AFib (on Eliquis), hypertension, HFpEF (EF: 55%) who presents with shortness of breath.     Patient presented to [facility] ED with shortness of breath. She reports sudden onset dyspnea that started on the night of presentation. She woke up from sleep and experienced respiratory distress which made her anxious. She also has a productive cough with green sputum that started several weeks prior, and was prescribed a Z-pack for on 2/22. Associated symptoms include generalized weakness. No urinary symptoms.     In the ED, the patient was tachypneic, tachycardic (120s) and was placed on 4 L O2.  Labs were remarkable for leukocytosis (23.6), elevated lactic acid (2.5 > 6.6).  UA showed positive nitrites, 5 RBCs, 20 WBCs and few bacteria.  CTA chest showed no PE, with minimal linear atelectasis at the anterior left lung base within the lingular segment left upper lobe and mild mosaic perfusion pattern throughout the lungs which could be secondary to chronic pulmonary vascular or airways process. .  Patient was given 500 mL of NS, ceftriaxone 1 g IV, DuoNeb x2, hydralazine 10 mg IV.  Family requested transfer to [facility] as it is closer to home.  She was admitted for further management.    * No surgery found *      Hospital Course:   Ms. Fraser was admitted with sepsis and hypoxemic respiratory failure secondary to pneumonia. She was started on IV antibiotics and  nebulizers.  WBC slowly trending down and feeling better each day.  PT/OT consulted.  Requesting nebulizers for home.  Started back on home Lasix and able to wean off oxygen prior to discharge.  Overall feeling much better and stable for discharge home.  Home health set up for patient.  Nebulizer and bedside commode ordered as well.  Discussed plan of care with patient and family.  She will be discharged home on p.o. antibiotics to complete a 7 day course.  She will need to follow up with her PCP within the next 1-2 weeks.  Patient discharged home in stable condition.     Goals of Care Treatment Preferences:  Code Status: Full Code      Consults:     No new Assessment & Plan notes have been filed under this hospital service since the last note was generated.  Service: Hospital Medicine    Final Active Diagnoses:    Diagnosis Date Noted POA    PRINCIPAL PROBLEM:  Community acquired pneumonia [J18.9] 03/11/2024 Yes    Hypoxia [R09.02] 03/13/2024 Yes    Asthma [J45.909] 03/11/2024 Yes    Chronic heart failure with preserved ejection fraction [I50.32] 03/11/2024 Yes    Anxiety [F41.9] 03/11/2024 Yes    Severe sepsis [A41.9, R65.20] 03/11/2024 Yes    Atrial fibrillation [I48.91] 06/21/2016 Yes      Problems Resolved During this Admission:       Discharged Condition: stable    Disposition: Home or Self Care    Follow Up:   Follow-up Information       Sherie Torres MD. Schedule an appointment as soon as possible for a visit.    Specialty: Family Medicine  Why: Outpatient follow up  Contact information:  [address] [facility]   [address] [place]  [place] [address] [phone]               [facility] Follow up.    Specialties: Home Health Services, Home Therapy Services, Home Living Aide Services  Why: Office will call patient with a date and time.  Contact information:  [address] [facility] [address] [phone]                         Patient Instructions:      NEBULIZER FOR HOME USE  "    Order Specific Question Answer Comments   Height: 4' 11" (1.499 m)    Weight: 80.5 kg (177 lb 7.5 oz)    Does patient have medical equipment at home? cane, quad    Does patient have medical equipment at home? walker, rolling    Length of need (1-99 months): 99      BATH/SHOWER CHAIR FOR HOME USE     Order Specific Question Answer Comments   Height: 4' 11" (1.499 m)    Weight: 80.5 kg (177 lb 7.5 oz)    Does patient have medical equipment at home? cane, quad    Does patient have medical equipment at home? walker, rolling    Length of need (1-99 months): 3    Type: With back      Diet Cardiac     Notify your health care provider if you experience any of the following:  temperature >100.4     Notify your health care provider if you experience any of the following:  persistent nausea and vomiting or diarrhea     Notify your health care provider if you experience any of the following:  severe uncontrolled pain     Notify your health care provider if you experience any of the following:  difficulty breathing or increased cough     Notify your health care provider if you experience any of the following:  severe persistent headache     Notify your health care provider if you experience any of the following:  worsening rash     Notify your health care provider if you experience any of the following:  persistent dizziness, light-headedness, or visual disturbances     Notify your health care provider if you experience any of the following:  increased confusion or weakness     Activity as tolerated       Significant Diagnostic Studies: Labs: All labs within the past 24 hours have been reviewed    Pending Diagnostic Studies:       None           Medications:  Reconciled Home Medications:      Medication List        START taking these medications      albuterol 1.25 mg/3 mL Nebu  Commonly known as: ACCUNEB  Take 3 mLs (1.25 mg total) by nebulization every 6 (six) hours as needed (shortness of breath). Rescue  Replaces: " albuterol 90 mcg/actuation inhaler  Notes to patient: Rescue inhaler, as needed     amoxicillin-clavulanate 875-125mg 875-125 mg per tablet  Commonly known as: AUGMENTIN  Take 1 tablet by mouth 2 (two) times daily.            CHANGE how you take these medications      apixaban 2.5 mg Tab  Commonly known as: ELIQUIS  Take 1 tablet (2.5 mg total) by mouth 2 (two) times daily.  What changed:   medication strength  how much to take  Notes to patient: Blood thinner     furosemide 20 MG tablet  Commonly known as: LASIX  Take 1 tablet (20 mg total) by mouth once daily.  What changed:   medication strength  how much to take  how to take this  when to take this  additional instructions  Notes to patient: Fluid pill            CONTINUE taking these medications      acetaminophen-codeine 300-30mg 300-30 mg Tab  Commonly known as: TYLENOL #3  TK 1 T PO  TID     ALPRAZolam 0.25 MG tablet  Commonly known as: XANAX  Take 0.25 mg by mouth 2 (two) times a day.     citalopram 20 MG tablet  Commonly known as: CeleXA  Take 20 mg by mouth 2 (two) times daily.     LIDOcaine-prilocaine cream  Commonly known as: EMLA  Apply topically 2 (two) times daily as needed.     metoprolol tartrate 50 MG tablet  Commonly known as: LOPRESSOR  Take 50 mg by mouth 2 (two) times daily.  Notes to patient: Blood pressure, heart rate     omeprazole 20 MG capsule  Commonly known as: PRILOSEC  Take 20 mg by mouth 2 (two) times daily.  Notes to patient: Stomach protectant     oxybutynin 10 MG 24 hr tablet  Commonly known as: DITROPAN-XL  Take 10 mg by mouth once daily.     SYMBICORT 80-4.5 mcg/actuation Hfaa  Generic drug: budesonide-formoterol 80-4.5 mcg  Inhale 2 puffs into the lungs 2 (two) times daily as needed.            STOP taking these medications      albuterol 90 mcg/actuation inhaler  Commonly known as: PROVENTIL/VENTOLIN HFA  Replaced by: albuterol 1.25 mg/3 mL Nebu     alendronate 10 MG Tab  Commonly known as: FOSAMAX     BREO ELLIPTA 100-25  mcg/dose diskus inhaler  Generic drug: fluticasone furoate-vilanteroL     HYDROcodone-acetaminophen 5-325 mg per tablet  Commonly known as: NORCO              Indwelling Lines/Drains at time of discharge:   Lines/Drains/Airways       None                   Time spent on the discharge of patient: >35 minutes         Saurabh Rosa MD  Department of Hospital Medicine  [facility] [place] - Telemetry

## 2024-04-18 ENCOUNTER — EXTERNAL HOME HEALTH (OUTPATIENT)
Dept: HOME HEALTH SERVICES | Facility: HOSPITAL | Age: 89
End: 2024-04-18
Payer: MEDICARE

## 2024-06-03 ENCOUNTER — DOCUMENT SCAN (OUTPATIENT)
Dept: HOME HEALTH SERVICES | Facility: HOSPITAL | Age: 89
End: 2024-06-03
Payer: MEDICARE

## 2024-06-17 PROBLEM — R65.20 SEVERE SEPSIS: Status: RESOLVED | Noted: 2024-03-11 | Resolved: 2024-06-17

## 2024-06-17 PROBLEM — A41.9 SEVERE SEPSIS: Status: RESOLVED | Noted: 2024-03-11 | Resolved: 2024-06-17

## 2024-06-17 PROBLEM — J18.9 COMMUNITY ACQUIRED PNEUMONIA: Status: RESOLVED | Noted: 2024-03-11 | Resolved: 2024-06-17
